# Patient Record
Sex: MALE | Race: WHITE | NOT HISPANIC OR LATINO | Employment: UNEMPLOYED | URBAN - METROPOLITAN AREA
[De-identification: names, ages, dates, MRNs, and addresses within clinical notes are randomized per-mention and may not be internally consistent; named-entity substitution may affect disease eponyms.]

---

## 2018-08-19 ENCOUNTER — HOSPITAL ENCOUNTER (EMERGENCY)
Facility: CLINIC | Age: 28
Discharge: HOME | End: 2018-08-19
Attending: NURSE PRACTITIONER | Admitting: NURSE PRACTITIONER
Payer: MEDICAID

## 2018-08-19 VITALS
RESPIRATION RATE: 18 BRPM | SYSTOLIC BLOOD PRESSURE: 142 MMHG | OXYGEN SATURATION: 99 % | HEART RATE: 105 BPM | BODY MASS INDEX: 25.06 KG/M2 | HEIGHT: 72 IN | DIASTOLIC BLOOD PRESSURE: 86 MMHG | WEIGHT: 185 LBS | TEMPERATURE: 98.4 F

## 2018-08-19 DIAGNOSIS — K08.89 PAIN, DENTAL: ICD-10-CM

## 2018-08-19 DIAGNOSIS — K04.7 DENTAL INFECTION: ICD-10-CM

## 2018-08-19 PROCEDURE — 99283 EMERGENCY DEPT VISIT LOW MDM: CPT | Mod: 25

## 2018-08-19 PROCEDURE — 64400 NJX AA&/STRD TRIGEMINAL NRV: CPT

## 2018-08-19 PROCEDURE — 25000132 ZZH RX MED GY IP 250 OP 250 PS 637: Performed by: NURSE PRACTITIONER

## 2018-08-19 RX ORDER — PENICILLIN V POTASSIUM 500 MG/1
500 TABLET, FILM COATED ORAL 3 TIMES DAILY
Qty: 30 TABLET | Refills: 0 | Status: SHIPPED | OUTPATIENT
Start: 2018-08-19 | End: 2018-08-29

## 2018-08-19 RX ORDER — PENICILLIN V POTASSIUM 500 MG/1
500 TABLET, FILM COATED ORAL ONCE
Status: COMPLETED | OUTPATIENT
Start: 2018-08-19 | End: 2018-08-19

## 2018-08-19 RX ORDER — BUPIVACAINE HYDROCHLORIDE AND EPINEPHRINE 5; 5 MG/ML; UG/ML
INJECTION, SOLUTION PERINEURAL
Status: DISCONTINUED
Start: 2018-08-19 | End: 2018-08-20 | Stop reason: HOSPADM

## 2018-08-19 RX ORDER — IBUPROFEN 600 MG/1
600 TABLET, FILM COATED ORAL EVERY 6 HOURS PRN
Qty: 40 TABLET | Refills: 0 | Status: SHIPPED | OUTPATIENT
Start: 2018-08-19

## 2018-08-19 RX ADMIN — PENICILLIN V POTASSIUM 500 MG: 500 TABLET, FILM COATED ORAL at 22:34

## 2018-08-19 ASSESSMENT — ENCOUNTER SYMPTOMS
FEVER: 0
VOMITING: 0
NAUSEA: 1
SINUS PRESSURE: 1

## 2018-08-19 NOTE — ED AVS SNAPSHOT
Emergency Department    [address] [place] [address]    Phone:  [phone]    Fax:  [phone]                                       Misha Sandoval   MRN: [id number]    Department:   Emergency Department   Date of Visit:  8/19/2018           After Visit Summary Signature Page     I have received my discharge instructions, and my questions have been answered. I have discussed any challenges I see with this plan with the nurse or doctor.    ..........................................................................................................................................  Patient/Patient Representative Signature      ..........................................................................................................................................  Patient Representative Print Name and Relationship to Patient    ..................................................               ................................................  Date                                            Time    ..........................................................................................................................................  Reviewed by Signature/Title    ...................................................              ..............................................  Date                                                            Time

## 2018-08-19 NOTE — ED AVS SNAPSHOT
Emergency Department    [address] [facility] [address]    Phone:  [phone]    Fax:  [phone]                                       Misha Sandoval   MRN: [id number]    Department:   Emergency Department   Date of Visit:  8/19/2018           Patient Information     Date Of Birth          1990        Your diagnoses for this visit were:     Pain, dental     Dental infection        You were seen by Gerri Soliz APRN CNP.      Follow-up Information     Follow up with Dentist, see list In 1 day.        Discharge Instructions         Discharge Instructions  Dental Pain    You have been seen today for a toothache. Your pain may be caused by an exposed nerve, an infection (pulpitis), a root abscess, or other problems. You will need to see a dentist for a solution to your tooth problem. Emergency Department care is only to help control your problem until you can see a dentist.  Today, we did not find any sign that your toothache was caused by a serious condition, but sometimes symptoms develop over time and cannot be found during an emergency visit, so it is very important that you follow up with your dentist.      Return to the Emergency Department if:    You develop a fever over 101 degrees Fahrenheit    You can t open your mouth normally, can t move your tongue well, or can t swallow    You have new or increased swelling of your face or neck.    You develop drainage of pus or foul smelling material from around your tooth.  What can I do to help myself?    Take any antibiotic the doctor may have prescribed for you today.    Avoid very hot or very cold foods as both can cause pain.    Make an appointment to see a dentist as soon as possible. If you wish, we can provide you with a list of low-cost dental clinics.       Remember that you can always come back to the Emergency Department if you are not able to see your regular doctor in the amount of time listed above, if you  get any new symptoms, or if there is anything that worries you.        Dental Resources  Name/Address/Phone Eligibility Hours Fee   [facility]  [address], [address]  [place] [address]  [phone] Anyone Call for appointment [facility]  Medical Assistance  Private Insurance   [facility]  [address] [place] [address]  [phone] Anyone Call for appointment    [place] refers to low-cost dental clinics for non-preventive care    [place] Interpreters available Prices start at   Adults        Cleaning $36-$160        X-Ray $20-40  Children        Cleaning $15        X-ray $10-20        Fluoride $10  Accepts cash, check or credit;  Does not take insurance or MA.   [facility]  [address] [place], [place]  [phone] Anyone Afternoons and evenings    September-May    Answers phones after 10 AM $30.00 per visit   ($15.00 per visit if 62 or older)   Preventive care.  Restoration care; sliding fee; MA   [facility]  [address] [place] [address]  [phone] Children birth to age 18 and pregnant women    [facility] Residents without insurance will be asked to apply for Assured Care. M TH F 8:30 am - 5 pm  T W 8:30 am - 7 pm    30 locations metro wide    Call for appointment and to confirm hours. Sliding Fee  [facility]  Medical Assistance  Assured Access  Private Insurance    8 Languages Spoken   [facility] [address] [address]  [phone] Anyone Call for appointment Sliding Fee  Accept insurance, [place],   [place] and self-pay.  Call if no insurance.    All services provided.  Staff fluent in Hmong, Laotian, Tristanian, Kinyarwanda, Venezuelan, Comoran, and Farsi.   [facility]  [address] [place], [place] [phone] Children  Adults in a walk in basis Mon - Fri. 8 - 5 pm       (closed 1-2 pm)  General  Dentists & Hygienists  Private Dentists  Dentures Fees based on family size and income ranging from 40% - 100% payment by patient.   [facility]  [address] [place]  [address]    [phone] Anyone Mon - Fri 7:30 am - 5:00 pm  By Appt.    Tues & Wed @ 3:00 call for urgent care Appt for next day service Sliding fee:  MA; Insurance   [place] [facility]  [address] [place]  [address]  [phone] Anyone Call for an appointment.  Days open vary every semester. Adult cleaning $25  Child cleaning $15  X-Rays $10-$15  Whitening services available  $75, includes cleaning  Seniors 50% off   [facility]  [address] [place]  [address] [phone] Anyone M-F 8 am - 5 pm Most insurances accepted.  MA and Sliding Scale.   [facility]  [address] [place] [address]  [phone] Anyone without insurance Call to make appt   M-F 9:00 am - 5 pm   (Closed noon hour 12-1)    6 pm- 8 pm Evening hours also available for care Sliding fee based on income and size of household.   [facility]  [address] [place] [address]  [phone] press option 1    For the [facility] press option 4 Anyone              Anyone Monday  4 - 6:30 pm  Tuesday 12:30 - 3 & 4-6:30  Thursday 8:30 - 11 am & 12-2:30 pm  September through May only    All year around on Thursdays from 5-9 pm (only time a dentist is in.) Cleanings & X-Rays Only  Cleanings:  Adults $30                   Kids $20  X-Rays:  Adults $34                Kids $10    MA and Sliding fee   [facility]  [address] [place] [address] [phone] Anyone    ([facility] interpreters available) M-F 8:00 am - 5:00 pm       By appointment only  (same day appointments available) Sliding fee ($40+ may be due at appointment, remainder billed); MA; Insurance   Open  [facility]  [address] [place] [address]    [phone]   Anyone    (Hmong interpreters available) M-Th 8:00 am - 8:00 pm  F 8:00 am - 5:00 pm    By appointment only  (same day appointments available) Sliding fee ($40+ may be due at appointment, remainder billed); MA; Insurance   [facility]  [address] [place], [place]  [address] [phone] Anyone    Must live within [facility] to qualify for sliding fee services Mon, Tues, Thurs, Fri  8:30 am - 5:00 pm  Wed. 8:30 am - 7:00 pm  All other services by appt. only Sliding Fee; MA   Offer payment plans    Have financial workers that will assist with [place] and will use sliding fee for those who do not qualify.      [facility]  [address] [place] [phone] Anyone without insurance     Hours and day of week vary  (Call ahead for hours)    Walk-in only Free Services    Cleanings; Fillings; Extractions   [facility]  [address] [place] [address]  [phone] Anyone Call for an appointment Accept patients with [place] and Medical Assistance.  10% discount if bill is paid in full on day of service.  No sliding fee scale.     [facility]  [address] [place] [address]  [phone] Anyone M-F  8 am-5 pm  Call for appt.    Walk-in hours 8 am - 11am and 1 pm - 5 pm, however take scheduled appointments first    No emergency services or oral surgeries Sliding Fee available with an [place] or [place] denial letter and proof of income.    Accepts Assured Access card and [place] coverage.     Name/Address/Phone Eligibility Hours Fee   [facility]  [address] [place], [place]  [phone] Anyone with emergencies only M & W clinic begins at 6 pm   Call ahead    Alternate Fridays for children by Appt only Free   [facility]  [address] [place]  [address]  [phone]    Emergencies (adults only):  [phone] Anyone Free walk-in screens for oral surgery    Call ahead for hours    All other services by appt. only  Accepts MA for pediatrics only    Rates are about 25% - 40% less than private dental office.    No sliding fee scale   [facility]  [address] [place] [address]  [phone] Anyone as long as they do not have health insurance Hours on Mondays, Tuesdays, and Thursdays Sliding fees based on monthly income    No root canals, tooth pulls or emergencies   [facility]  [address] [place] [phone] Anyone  M - F 8:00 am - 5:00 pm  Wed 8:00 am - 8 pm Sliding fees; MA; Insurance   [facility]  [address] [place] [address]  [phone] Anyone age 55+ M - F 8:00 am - 4:30 pm    Appt. only Set slightly lower fees;  MA; Insurance         Give Kids a smile day in [facility] Children Takes place in February at a few locations             24 Hour Appointment Hotline       To make an appointment at any [facility], call [phone]). If you don't have a family doctor or clinic, we will help you find one. [facility] are conveniently located to serve the needs of you and your family.             Review of your medicines      START taking        Dose / Directions Last dose taken    ibuprofen 600 MG tablet   Commonly known as:  ADVIL/MOTRIN   Dose:  600 mg   Quantity:  40 tablet        Take 1 tablet (600 mg) by mouth every 6 hours as needed for moderate pain   Refills:  0        penicillin V potassium 500 MG tablet   Commonly known as:  VEETID   Dose:  500 mg   Quantity:  30 tablet        Take 1 tablet (500 mg) by mouth 3 times daily for 10 days   Refills:  0                Prescriptions were sent or printed at these locations (2 Prescriptions)                   Other Prescriptions                Printed at Department/Unit printer (2  of 2)         ibuprofen (ADVIL/MOTRIN) 600 MG tablet               penicillin V potassium (VEETID) 500 MG tablet                Orders Needing Specimen Collection     None      Pending Results     No orders found from 8/17/2018 to 8/20/2018.            Pending Culture Results     No orders found from 8/17/2018 to 8/20/2018.            Pending Results Instructions     If you had any lab results that were not finalized at the time of your Discharge, you can call the ED Lab Result RN at [phone]. You will be contacted by this team for any positive Lab results or changes in treatment. The nurses are available 7 days a week from 10A to 6:30P.  You can leave a message 24 hours per day and they will return your call.        Test Results From Your Hospital Stay               Clinical Quality Measure: Blood Pressure Screening     Your blood pressure was checked while you were in the emergency department today. The last reading we obtained was  BP: 142/86 . Please read the guidelines below about what these numbers mean and what you should do about them.  If your systolic blood pressure (the top number) is less than 120 and your diastolic blood pressure (the bottom number) is less than 80, then your blood pressure is normal. There is nothing more that you need to do about it.  If your systolic blood pressure (the top number) is 120-139 or your diastolic blood pressure (the bottom number) is 80-89, your blood pressure may be higher than it should be. You should have your blood pressure rechecked within a year by a primary care provider.  If your systolic blood pressure (the top number) is 140 or greater or your diastolic blood pressure (the bottom number) is 90 or greater, you may have high blood pressure. High blood pressure is treatable, but if left untreated over time it can put you at risk for heart attack, stroke, or kidney failure. You should have your blood pressure rechecked by a primary care provider within the  "next 4 weeks.  If your provider in the emergency department today gave you specific instructions to follow-up with your doctor or provider even sooner than that, you should follow that instruction and not wait for up to 4 weeks for your follow-up visit.        Thank you for choosing [facility]       Thank you for choosing [facility] for your care. Our goal is always to provide you with excellent care. Hearing back from our patients is one way we can continue to improve our services. Please take a few minutes to complete the written survey that you may receive in the mail after you visit with us. Thank you!        [employer] Information     [employer] lets you send messages to your doctor, view your test results, renew your prescriptions, schedule appointments and more. To sign up, go to [email or web address] . Click on \"Log in\" on the left side of the screen, which will take you to the Welcome page. Then click on \"Sign up Now\" on the right side of the page.     You will be asked to enter the access code listed below, as well as some personal information. Please follow the directions to create your username and password.     Your access code is: [email or web address]  Expires: 2018 10:23 PM     Your access code will  in 90 days. If you need help or a new code, please call your [facility] clinic or [phone].        Care EveryWhere ID     This is your Care EveryWhere ID. This could be used by other organizations to access your [facility] medical records  [id number]        Equal Access to Services     RUMA RODRÍGUEZ AH: Hadii laura Jalloh, waaxda lujosefaadaha, qaybta kaalmada karie, reji jones. So [facility] [phone].    ATENCIÓN: Si habla español, tiene a velasco disposición servicios gratuitos de asistencia lingüística. Wilfredo al [phone].    We comply with applicable federal civil rights laws and [place] laws. We do not discriminate on the basis of race, color, national origin, age, " disability, sex, sexual orientation, or gender identity.            After Visit Summary       This is your record. Keep this with you and show to your community pharmacist(s) and doctor(s) at your next visit.

## 2018-08-20 NOTE — ED PROVIDER NOTES
History     Chief Complaint:  Dental Pain     HPI   Misha Sandoval is a 28 year old male who presents for evaluation of dental pain. Approximately five days ago, the patient started to develop left-sided dental pain with associated nausea and sinus pressure as well as the sensation of swelling in his throat. He has had a bad taste in his mouth and has tasted some discharge from near the site of his pain. Due to his persistent dental pain tonight, the patient came into the ED for evaluation with primary concern that he has a dental infection. Currently in the ED, the patient rates his pain at a severity of 8/10. He has not yet contacted his dentist regarding his recent dental pain. He has not had any fever or vomiting.     Allergies:  Codeine      Medications:    The patient is not currently taking any prescribed medications.      Past Medical History:    The patient denies any relevant past medical history.     Past Surgical History:    History reviewed. No pertinent past surgical history.     Family History:    History reviewed. No pertinent family history.     Social History:  Tobacco use:    Current every day smoker  Alcohol use:    Positive  Marital status:    Single   Accompanied to ED by:  Friend      Review of Systems   Constitutional: Negative for fever.   HENT: Positive for dental problem and sinus pressure.    Gastrointestinal: Positive for nausea. Negative for vomiting.   All other systems reviewed and are negative.    Physical Exam   First Vitals:  BP: 142/86  Pulse: 105  Heart Rate: 105  Temp: 98.4  F (36.9  C)  Resp: 18  Height: 182.9 cm (6')  Weight: 83.9 kg (185 lb)  SpO2: 99 %    Physical Exam  Physical Exam   Constitutional:  Sitting up in bed comfortably. Non-toxic appearing.   Head: Head moves freely with normal range of motion.   ENT: Oropharynx is clear and moist. No trismus. Uvula midline with no swelling. No obvious gingival fluctuance or erythema surrounding the left upper back molar. No  facial erythema or edema. No submandibular fullness.   Eyes: Conjunctivae pink. EOMs intact.   Neck: Normal range of motion.   Cardiovascular: Regular rate and rhythm. Normal heart sounds. No concerning murmur. Intact distal pulses: radial pulses 2+ on the right, 2+ on the left.   Pulmonary/Chest: No respiratory distress. No decreased breath sounds. No wheezes. No rhonchi. No rales. Lungs clear throughout.   Abdominal: Soft. Non-tender. No rebound, no guarding.   Musculoskeletal: No peripheral edema. Distal capillary refill and sensation intact.   Neurological: Oriented to person, place, and time. No focal deficits.   Skin: Skin is warm and normal in color. No rash noted.      Emergency Department Course     Procedures:    Dental Block     INDICATIONS: Dental pain    ANESTHESIA: Local infiltration using 0.5% Bupivacaine with epinephrine surrounding the left upper back molar..     PATIENT STATUS: The patient tolerated the procedure well and there were no immediate complications.      Interventions:  Veetid 500 mg PO     Emergency Department Course:  Nursing notes and vitals reviewed.  2152: I performed an exam of the patient as documented above.     2200:  A dental block was performed as outlined in the procedure note above.  The patient tolerated well and there were no complications.     Findings and plan explained to the Patient. Patient discharged home with instructions regarding supportive care, medications, and reasons to return. The importance of close follow-up was reviewed. The patient was prescribed Veetid and Ibuprofen.     Impression & Plan      Medical Decision Making:    After evaluation of the gums and teeth there is no evidence for obvious dental abscess that would be amenable to bedside I & D, no exam evidence to suggest ulcerative gingivitis, facial or oral floor cellulitis. Pain is most likely related to dental infection secondary to dental decay/caries.     Plan: Local infiltration for immediate  pain relief. Ibuprofen for ongoing pain control. Pen VK tid x 10 days, initial dose given here. Need for dentist follow up as soon as able. Given list. Reasons to return here discussed. Patient amenable to plan.       Diagnosis:    ICD-10-CM   1. Pain, dental K08.89   2. Dental infection K04.7       Disposition:  Discharged to home with Veetid and Ibuprofen.     Discharge Medications:  New Prescriptions    IBUPROFEN (ADVIL/MOTRIN) 600 MG TABLET    Take 1 tablet (600 mg) by mouth every 6 hours as needed for moderate pain    PENICILLIN V POTASSIUM (VEETID) 500 MG TABLET    Take 1 tablet (500 mg) by mouth 3 times daily for 10 days         Nain STARK, daniela serving as a scribe at 9:52 PM on 8/19/2018 to document services personally performed by Gerri Soliz NP, based on my observations and the provider's statements to me.     EMERGENCY DEPARTMENT       Gerri Soliz APRN CNP  08/19/18 4078

## 2018-08-20 NOTE — DISCHARGE INSTRUCTIONS
Discharge Instructions  Dental Pain    You have been seen today for a toothache. Your pain may be caused by an exposed nerve, an infection (pulpitis), a root abscess, or other problems. You will need to see a dentist for a solution to your tooth problem. Emergency Department care is only to help control your problem until you can see a dentist.  Today, we did not find any sign that your toothache was caused by a serious condition, but sometimes symptoms develop over time and cannot be found during an emergency visit, so it is very important that you follow up with your dentist.      Return to the Emergency Department if:    You develop a fever over 101 degrees Fahrenheit    You can t open your mouth normally, can t move your tongue well, or can t swallow    You have new or increased swelling of your face or neck.    You develop drainage of pus or foul smelling material from around your tooth.  What can I do to help myself?    Take any antibiotic the doctor may have prescribed for you today.    Avoid very hot or very cold foods as both can cause pain.    Make an appointment to see a dentist as soon as possible. If you wish, we can provide you with a list of low-cost dental clinics.       Remember that you can always come back to the Emergency Department if you are not able to see your regular doctor in the amount of time listed above, if you get any new symptoms, or if there is anything that worries you.        Dental Resources  Name/Address/Phone Eligibility Hours Fee   [facility]  [address], [address]  [place] [address]  [phone] Anyone Call for appointment [facility]  Medical [facility]  Private Insurance   [facility]  Hygiene Clinic  [facility] [place], [place] [id number] [phone] Anyone Call for appointment    [facility] refers to low-cost dental clinics for non-preventive care    Icelandic Interpreters available Prices start at   Adults        Cleaning $36-$160         X-Ray $20-40  Children        Cleaning $15        X-ray $10-20        Fluoride $10  Accepts cash, check or credit;  Does not take insurance or MA.   [facility]  [address] [place], [place]  [id number] [phone] Anyone Afternoons and evenings    September-May    Answers phones after 10 AM $30.00 per visit   ($15.00 per visit if 62 or older)   Preventive care.  Restoration care; sliding fee; MA   [facility]  [address] [place] [address] [phone] Children birth to age 18 and pregnant women    [facility] Residents without insurance will be asked to apply for Assured Care. M TH F 8:30 am - 5 pm  T W 8:30 am - 7 pm    30 locations metro wide    Call for appointment and to confirm hours. Sliding Fee  [facility]  Medical Assistance  Assured Access  Private Insurance    8 Languages Spoken   [facility] [address] [address]  [phone] Anyone Call for appointment Sliding Fee  Accept insurance, [place],   [place] and self-pay.  Call if no insurance.    All services provided.  Staff fluent in Hmong, Laotian, Syriac, Setswana, Frisian, Tamazight, and Farsi.   [facility]  [address] [place], [place] [id number] [phone] Children  Adults in a walk in basis Mon - Fri. 8 - 5 pm       (closed 1-2 pm)  General Dentists & Hygienists  Private Dentists  Dentures Fees based on family size and income ranging from 40% - 100% payment by patient.   [facility]  [address] [place], [place]  [id number] [phone] Anyone Mon - Fri 7:30 am - 5:00 pm  By Appt.    Tues & Wed @ 3:00 call for urgent care Appt for next day service Sliding fee:  MA; Insurance   [facility]  [address] [place], [place]  [id number] [phone] Anyone Call for an appointment.  Days open vary every semester. Adult cleaning $25  Child cleaning $15  X-Rays  $10-$15  Whitening services available  $75, includes cleaning  Seniors 50% off   [facility]  [address] [place], [place]  [id number] [phone] Anyone M-F 8 am - 5 pm Most insurances accepted.  MA and Sliding Scale.   [facility]  [facility] [place], [place]  [id number] [phone] Anyone without insurance Call to make appt   M-F 9:00 am - 5 pm   (Closed noon hour 12-1)    6 pm- 8 pm Evening hours also available for care Sliding fee based on income and size of household.   [facility]ental Clinic  [address] [place]  [address]  [phone] press option 1    For the [facility] press option 4 Anyone              Anyone Monday  4 - 6:30 pm  Tuesday 12:30 - 3 & 4-6:30  Thursday 8:30 - 11 am & 12-2:30 pm  September through May only    All year around on Thursdays from 5-9 pm (only time a dentist is in.) Cleanings & X-Rays Only  Cleanings:  Adults $30                   Kids $20  X-Rays:  Adults $34                Kids $10    MA and Sliding fee   [facility]  [address] [place], [place] [phone] Anyone    (Umbel interpreters available) M-F 8:00 am - 5:00 pm       By appointment only  (same day appointments available) Sliding fee ($40+ may be due at appointment, remainder billed); MA; Insurance   [facility]  [address] [place], [place] [phone]   Anyone    (Beakerong interpreters available) M-Th 8:00 am - 8:00 pm  F 8:00 am - 5:00 pm    By appointment only  (same day appointments available) Sliding fee ($40+ may be due at appointment, remainder billed); MA; Insurance   [facility]  [address] [place], [place]  [id number] [phone] Anyone    Must live within [facility] to qualify for sliding fee services Mon, Tues, Thurs, Fri  8:30 am - 5:00 pm  Wed. 8:30 am - 7:00 pm  All other services by  appt. only Sliding Fee; MA   Offer payment plans    Have financial workers that will assist with [place] and will use sliding fee for those who do not qualify.      [facility]  [address] [place] [address]  [phone] Anyone without insurance     Hours and day of week vary  (Call ahead for hours)    Walk-in only Free Services    Cleanings; Fillings; Extractions   [address]  [address] [phone] Anyone Call for an appointment Accept patients with [place] and Medical Assistance.  10% discount if bill is paid in full on day of service.  No sliding fee scale.     [facility]  [address] [place], [place] [phone] Anyone M-F  8 am-5 pm  Call for appt.    Walk-in hours 8 am - 11am and 1 pm - 5 pm, however take scheduled appointments first    No emergency services or oral surgeries Sliding Fee available with an [place] or [place] denial letter and proof of income.    Accepts Assured Access card and MA coverage.     Name/Address/Phone Eligibility Hours Fee   [facility]  [address] [place], [place]  [phone] Anyone with emergencies only M & W clinic begins at 6 pm   Call ahead    Alternate Fridays for children by Appt only Free   [facility]  [address] [place] [address]  [phone]    Emergencies (adults only):  [phone] Anyone Free walk-in screens for oral surgery    Call ahead for hours    All other services by appt. only  Accepts MA for pediatrics only    Rates are about 25% - 40% less than private dental office.    No sliding fee scale   [facility]  [address] [place], [place]  [phone] Anyone as long as they do not have health insurance Hours on Mondays, Tuesdays, and Thursdays Sliding fees based on monthly income    No root canals, tooth pulls or emergencies   [facility]  [address] [facility] [address] [address] [phone] Anyone  M - F 8:00 am - 5:00 pm  Wed 8:00 am - 8 pm Sliding fees; MA; Insurance   [address]  [address] [phone] Anyone age 55+ M - F 8:00 am - 4:30 pm    Appt. only Set slightly lower fees;  MA; Insurance         Give Kids a smile day in [facility] Children Takes place in February at a few locations

## 2024-07-27 ENCOUNTER — HOSPITAL ENCOUNTER (EMERGENCY)
Facility: CLINIC | Age: 34
Discharge: HOME | End: 2024-07-27
Attending: EMERGENCY MEDICINE | Admitting: EMERGENCY MEDICINE
Payer: COMMERCIAL

## 2024-07-27 ENCOUNTER — APPOINTMENT (OUTPATIENT)
Dept: GENERAL RADIOLOGY | Facility: CLINIC | Age: 34
End: 2024-07-27
Attending: EMERGENCY MEDICINE
Payer: COMMERCIAL

## 2024-07-27 VITALS
RESPIRATION RATE: 20 BRPM | DIASTOLIC BLOOD PRESSURE: 86 MMHG | OXYGEN SATURATION: 96 % | HEART RATE: 127 BPM | SYSTOLIC BLOOD PRESSURE: 134 MMHG

## 2024-07-27 DIAGNOSIS — M25.561 ACUTE PAIN OF RIGHT KNEE: ICD-10-CM

## 2024-07-27 DIAGNOSIS — R00.0 TACHYCARDIA: ICD-10-CM

## 2024-07-27 DIAGNOSIS — F32.A DEPRESSION, UNSPECIFIED DEPRESSION TYPE: ICD-10-CM

## 2024-07-27 DIAGNOSIS — F41.9 ANXIETY: ICD-10-CM

## 2024-07-27 PROCEDURE — 99283 EMERGENCY DEPT VISIT LOW MDM: CPT

## 2024-07-27 PROCEDURE — 250N000013 HC RX MED GY IP 250 OP 250 PS 637: Performed by: EMERGENCY MEDICINE

## 2024-07-27 PROCEDURE — 73562 X-RAY EXAM OF KNEE 3: CPT | Mod: RT

## 2024-07-27 RX ORDER — ACETAMINOPHEN 325 MG/1
650 TABLET ORAL ONCE
Status: COMPLETED | OUTPATIENT
Start: 2024-07-27 | End: 2024-07-27

## 2024-07-27 RX ORDER — IBUPROFEN 600 MG/1
600 TABLET, FILM COATED ORAL ONCE
Status: COMPLETED | OUTPATIENT
Start: 2024-07-27 | End: 2024-07-27

## 2024-07-27 RX ADMIN — ACETAMINOPHEN 650 MG: 325 TABLET, FILM COATED ORAL at 07:16

## 2024-07-27 RX ADMIN — IBUPROFEN 600 MG: 600 TABLET ORAL at 07:17

## 2024-07-27 ASSESSMENT — ACTIVITIES OF DAILY LIVING (ADL)
ADLS_ACUITY_SCORE: 35
ADLS_ACUITY_SCORE: 35
ADLS_ACUITY_SCORE: 33

## 2024-07-27 NOTE — ED NOTES
"Writer attempted to draw patient blood. Patient refusing at this time stating, \"I don't want my blood drawn at this time. I was suppose to get something for pain and my xrays.\" MD notified. Patient asking about going to EMPATH.  "
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
## 2024-07-27 NOTE — ED NOTES
"Patient arrives via EMS from [facility] for evaluation of disorganized thinking and paranoia. Patient voluntarily arrived at the PD and asked for a ride to the hospital but gave no further explanation. Per EMS, patient was stating things like \"I don't safe\" and \"People are trying to kill me\". Per EMS, PD refused to give any explanation as to why the patient came in to the department.   "

## 2024-07-27 NOTE — ED NOTES
Patient left room and went out to triage, after EMS left. Patient placed on ALON by verbal per MD. Patient spoke to provider in hallway. Patient elected to leave after talking to provider and provider ok with patient leaving. Patient given backpack and rest of belongings.

## 2024-07-27 NOTE — ED NOTES
While RN was receiving report on the patient, the patient eloped to triage and was asking for a ride and how to find another hospital. RN asked MD if patient was holdable and relayed EMS's report on the patient. MD stated patient was holdable due to the information that was given by EMS. Code 21 was called. MD evaluated patient immediately, MD deemed patient safe to discharge.
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
## 2024-07-27 NOTE — ED NOTES
[facility]  ED to EMPATH Checklist:      Goal for EMPATH: Paranoia management    Current Behavior: Flat Affect, Restless, and Cooperative    Safety Concerns: None    Legal Hold Status: Voluntary    Medically Cleared by ED provider: Yes    Patient Therapeutically Searched: Refused    Belongings: Remain with patient    Independent Ambulation at Baseline: Yes/No: Yes    Participates in Care/Conversation: Yes/No: Yes    Patient Informed about EMPATH: Yes/No: Yes    DEC: Ordered and pending    Patient Ready to be Transferred to EMPATH? Yes/No: Yes

## 2024-07-27 NOTE — ED NOTES
Received call from triage stating the taxi was here for the patient. Writer went to check in on patient. Patient is not in room, backpack is gone. There is a pair of sunglasses and a black bag left on the floor.

## 2025-01-25 ENCOUNTER — HOSPITAL ENCOUNTER (EMERGENCY)
Facility: CLINIC | Age: 35
Discharge: HOME | End: 2025-01-25
Attending: EMERGENCY MEDICINE | Admitting: EMERGENCY MEDICINE
Payer: COMMERCIAL

## 2025-01-25 VITALS
HEART RATE: 117 BPM | DIASTOLIC BLOOD PRESSURE: 82 MMHG | BODY MASS INDEX: 26.09 KG/M2 | RESPIRATION RATE: 18 BRPM | OXYGEN SATURATION: 99 % | HEIGHT: 72 IN | SYSTOLIC BLOOD PRESSURE: 131 MMHG | WEIGHT: 192.6 LBS | TEMPERATURE: 98.4 F

## 2025-01-25 DIAGNOSIS — F19.90 POLYSUBSTANCE USE DISORDER: ICD-10-CM

## 2025-01-25 PROCEDURE — 99283 EMERGENCY DEPT VISIT LOW MDM: CPT | Performed by: EMERGENCY MEDICINE

## 2025-01-25 PROCEDURE — 250N000013 HC RX MED GY IP 250 OP 250 PS 637: Performed by: EMERGENCY MEDICINE

## 2025-01-25 RX ORDER — ONDANSETRON 4 MG/1
4 TABLET, ORALLY DISINTEGRATING ORAL ONCE
Status: COMPLETED | OUTPATIENT
Start: 2025-01-25 | End: 2025-01-25

## 2025-01-25 RX ORDER — LORAZEPAM 1 MG/1
1 TABLET ORAL ONCE
Status: COMPLETED | OUTPATIENT
Start: 2025-01-25 | End: 2025-01-25

## 2025-01-25 RX ORDER — POLYETHYLENE GLYCOL 3350 17 G
4 POWDER IN PACKET (EA) ORAL
Status: DISCONTINUED | OUTPATIENT
Start: 2025-01-25 | End: 2025-01-25 | Stop reason: HOSPADM

## 2025-01-25 RX ORDER — ACETAMINOPHEN 325 MG/1
650 TABLET ORAL EVERY 4 HOURS PRN
Status: DISCONTINUED | OUTPATIENT
Start: 2025-01-25 | End: 2025-01-25 | Stop reason: HOSPADM

## 2025-01-25 RX ADMIN — LORAZEPAM 1 MG: 1 TABLET ORAL at 19:57

## 2025-01-25 RX ADMIN — NICOTINE POLACRILEX 4 MG: 2 LOZENGE ORAL at 19:58

## 2025-01-25 ASSESSMENT — COLUMBIA-SUICIDE SEVERITY RATING SCALE - C-SSRS
2. HAVE YOU ACTUALLY HAD ANY THOUGHTS OF KILLING YOURSELF IN THE PAST MONTH?: NO
6. HAVE YOU EVER DONE ANYTHING, STARTED TO DO ANYTHING, OR PREPARED TO DO ANYTHING TO END YOUR LIFE?: NO
1. IN THE PAST MONTH, HAVE YOU WISHED YOU WERE DEAD OR WISHED YOU COULD GO TO SLEEP AND NOT WAKE UP?: NO

## 2025-01-25 ASSESSMENT — ACTIVITIES OF DAILY LIVING (ADL)
ADLS_ACUITY_SCORE: 41
ADLS_ACUITY_SCORE: 41

## 2025-01-26 ENCOUNTER — HOSPITAL ENCOUNTER (EMERGENCY)
Facility: CLINIC | Age: 35
Discharge: HOME | End: 2025-01-27
Attending: EMERGENCY MEDICINE | Admitting: EMERGENCY MEDICINE
Payer: COMMERCIAL

## 2025-01-26 DIAGNOSIS — J10.1 INFLUENZA A: ICD-10-CM

## 2025-01-26 LAB
BASOPHILS # BLD AUTO: 0 10E3/UL (ref 0–0.2)
BASOPHILS NFR BLD AUTO: 0 %
EOSINOPHIL # BLD AUTO: 0.2 10E3/UL (ref 0–0.7)
EOSINOPHIL NFR BLD AUTO: 3 %
ERYTHROCYTE [DISTWIDTH] IN BLOOD BY AUTOMATED COUNT: 12.2 % (ref 10–15)
HCT VFR BLD AUTO: 41.2 % (ref 40–53)
HGB BLD-MCNC: 13.7 G/DL (ref 13.3–17.7)
IMM GRANULOCYTES # BLD: 0 10E3/UL
IMM GRANULOCYTES NFR BLD: 0 %
LYMPHOCYTES # BLD AUTO: 1.8 10E3/UL (ref 0.8–5.3)
LYMPHOCYTES NFR BLD AUTO: 30 %
MCH RBC QN AUTO: 29.8 PG (ref 26.5–33)
MCHC RBC AUTO-ENTMCNC: 33.3 G/DL (ref 31.5–36.5)
MCV RBC AUTO: 90 FL (ref 78–100)
MONOCYTES # BLD AUTO: 0.5 10E3/UL (ref 0–1.3)
MONOCYTES NFR BLD AUTO: 7 %
NEUTROPHILS # BLD AUTO: 3.6 10E3/UL (ref 1.6–8.3)
NEUTROPHILS NFR BLD AUTO: 59 %
NRBC # BLD AUTO: 0 10E3/UL
NRBC BLD AUTO-RTO: 0 /100
PLATELET # BLD AUTO: 187 10E3/UL (ref 150–450)
RBC # BLD AUTO: 4.59 10E6/UL (ref 4.4–5.9)
WBC # BLD AUTO: 6.1 10E3/UL (ref 4–11)

## 2025-01-26 PROCEDURE — 96375 TX/PRO/DX INJ NEW DRUG ADDON: CPT | Performed by: EMERGENCY MEDICINE

## 2025-01-26 PROCEDURE — 85004 AUTOMATED DIFF WBC COUNT: CPT | Performed by: EMERGENCY MEDICINE

## 2025-01-26 PROCEDURE — 99284 EMERGENCY DEPT VISIT MOD MDM: CPT | Mod: 25 | Performed by: EMERGENCY MEDICINE

## 2025-01-26 PROCEDURE — 85014 HEMATOCRIT: CPT | Performed by: EMERGENCY MEDICINE

## 2025-01-26 PROCEDURE — 84484 ASSAY OF TROPONIN QUANT: CPT | Performed by: EMERGENCY MEDICINE

## 2025-01-26 PROCEDURE — 96374 THER/PROPH/DIAG INJ IV PUSH: CPT | Performed by: EMERGENCY MEDICINE

## 2025-01-26 PROCEDURE — 80053 COMPREHEN METABOLIC PANEL: CPT | Performed by: EMERGENCY MEDICINE

## 2025-01-26 PROCEDURE — 258N000003 HC RX IP 258 OP 636: Performed by: EMERGENCY MEDICINE

## 2025-01-26 PROCEDURE — 96361 HYDRATE IV INFUSION ADD-ON: CPT | Performed by: EMERGENCY MEDICINE

## 2025-01-26 PROCEDURE — 250N000011 HC RX IP 250 OP 636: Performed by: EMERGENCY MEDICINE

## 2025-01-26 PROCEDURE — 87637 SARSCOV2&INF A&B&RSV AMP PRB: CPT | Performed by: EMERGENCY MEDICINE

## 2025-01-26 PROCEDURE — 93005 ELECTROCARDIOGRAM TRACING: CPT | Performed by: EMERGENCY MEDICINE

## 2025-01-26 PROCEDURE — 250N000013 HC RX MED GY IP 250 OP 250 PS 637: Performed by: EMERGENCY MEDICINE

## 2025-01-26 PROCEDURE — 36415 COLL VENOUS BLD VENIPUNCTURE: CPT | Performed by: EMERGENCY MEDICINE

## 2025-01-26 PROCEDURE — 93010 ELECTROCARDIOGRAM REPORT: CPT | Performed by: EMERGENCY MEDICINE

## 2025-01-26 PROCEDURE — 99284 EMERGENCY DEPT VISIT MOD MDM: CPT | Performed by: EMERGENCY MEDICINE

## 2025-01-26 PROCEDURE — 83690 ASSAY OF LIPASE: CPT | Performed by: EMERGENCY MEDICINE

## 2025-01-26 PROCEDURE — 250N000009 HC RX 250: Performed by: EMERGENCY MEDICINE

## 2025-01-26 RX ORDER — ACETAMINOPHEN 325 MG/1
650 TABLET ORAL ONCE
Status: COMPLETED | OUTPATIENT
Start: 2025-01-26 | End: 2025-01-26

## 2025-01-26 RX ORDER — ONDANSETRON 2 MG/ML
4 INJECTION INTRAMUSCULAR; INTRAVENOUS ONCE
Status: COMPLETED | OUTPATIENT
Start: 2025-01-26 | End: 2025-01-26

## 2025-01-26 RX ORDER — LIDOCAINE HYDROCHLORIDE 20 MG/ML
10 SOLUTION OROPHARYNGEAL ONCE
Status: COMPLETED | OUTPATIENT
Start: 2025-01-26 | End: 2025-01-26

## 2025-01-26 RX ORDER — MAGNESIUM HYDROXIDE/ALUMINUM HYDROXICE/SIMETHICONE 120; 1200; 1200 MG/30ML; MG/30ML; MG/30ML
15 SUSPENSION ORAL ONCE
Status: COMPLETED | OUTPATIENT
Start: 2025-01-26 | End: 2025-01-26

## 2025-01-26 RX ADMIN — ACETAMINOPHEN 650 MG: 325 TABLET, FILM COATED ORAL at 23:31

## 2025-01-26 RX ADMIN — ALUMINUM HYDROXIDE, MAGNESIUM HYDROXIDE, AND SIMETHICONE 15 ML: 200; 200; 20 SUSPENSION ORAL at 23:31

## 2025-01-26 RX ADMIN — PANTOPRAZOLE SODIUM 40 MG: 40 INJECTION, POWDER, FOR SOLUTION INTRAVENOUS at 23:31

## 2025-01-26 RX ADMIN — ONDANSETRON 4 MG: 2 INJECTION INTRAMUSCULAR; INTRAVENOUS at 23:31

## 2025-01-26 RX ADMIN — SODIUM CHLORIDE 1000 ML: 9 INJECTION, SOLUTION INTRAVENOUS at 23:31

## 2025-01-26 RX ADMIN — LIDOCAINE HYDROCHLORIDE 10 ML: 20 SOLUTION ORAL at 23:31

## 2025-01-26 ASSESSMENT — COLUMBIA-SUICIDE SEVERITY RATING SCALE - C-SSRS
1. IN THE PAST MONTH, HAVE YOU WISHED YOU WERE DEAD OR WISHED YOU COULD GO TO SLEEP AND NOT WAKE UP?: NO
6. HAVE YOU EVER DONE ANYTHING, STARTED TO DO ANYTHING, OR PREPARED TO DO ANYTHING TO END YOUR LIFE?: NO
2. HAVE YOU ACTUALLY HAD ANY THOUGHTS OF KILLING YOURSELF IN THE PAST MONTH?: NO

## 2025-01-26 ASSESSMENT — ACTIVITIES OF DAILY LIVING (ADL): ADLS_ACUITY_SCORE: 41

## 2025-01-26 NOTE — ED TRIAGE NOTES
Pt reports using meth (daily), fentanyl (couple times a week), and marijuana rarely use.) Pt reports, trying to not use fentanyl. Pt currently has been using more meth daily via snort, injection, and smoke. Last use meth this morning.     Pt is here seeking tx program/facility. Pt was advised to come to the ED from [facility] team per pt.      Triage Assessment (Adult)       Row Name 01/25/25 0504          Triage Assessment    Airway WDL WDL        Respiratory WDL    Respiratory WDL WDL        Skin Circulation/Temperature WDL    Skin Circulation/Temperature WDL WDL        Cardiac WDL    Cardiac WDL WDL        Peripheral/Neurovascular WDL    Peripheral Neurovascular WDL WDL        Cognitive/Neuro/Behavioral WDL    Cognitive/Neuro/Behavioral WDL WDL

## 2025-01-26 NOTE — DISCHARGE INSTRUCTIONS
[place] [address], [address]  [place] [address]  Phone: [phone]  Fax:  [phone]       Hours:       Monday, Wednesday, Friday     Scheduled visits: 9:00 am - 4:00 pm     Walk-in hours:  9:00 am - 3:00 pm        Tuesday, Thursday     Walk-in only hours: 1:30 pm - 4:00 pm

## 2025-01-26 NOTE — ED PROVIDER NOTES
"    [facility] [place]    1/25/25      ED PROVIDER NOTE    History     Chief Complaint   Patient presents with    Addiction Problem     The history is provided by the patient and medical records.     Misha Sandoval is a 34 year old male with a history notable for polysubstance abuse (with opioids, methamphetamine, and marijuana) with IVDU who presents to the ED seeking addiction help. He uses meth daily. He states he smokes, smokes, and injects meth. He has been using more meth because he has been trying to cut down on fentanyl use. He uses fentanyl a few times a week. He also reports rare marijuana use. No etoh use reported. He states that he feels his substance use has negatively been impacting his life so the crisis response team sent him here.     He has been to [facility] before but did not like it. He states he is not allowed to go to [address] [place] [address].     He states he feels anxious being here \"almost a panic attack.\" He denies chest pain or shortness of breath.    Past Medical History  History reviewed. No pertinent past medical history.  History reviewed. No pertinent surgical history.  ibuprofen (ADVIL/MOTRIN) 600 MG tablet      Allergies   Allergen Reactions    Codeine Hives     Family History  History reviewed. No pertinent family history.  Social History   Social History     Tobacco Use    Smoking status: Every Day    Smokeless tobacco: Never   Substance Use Topics    Alcohol use: Yes    Drug use: Yes     Types: Marijuana, Fentanyl, Methamphetamines      Past medical history, past surgical history, medications, allergies, family history, and social history were reviewed with the patient. No additional pertinent items.     A medically appropriate review of systems was performed with pertinent positives and negatives noted in the HPI, and all other systems negative.    Physical Exam   BP: 124/79  Pulse: (!) 120  Temp: 98  F (36.7  C)  Resp: 18  Height: 182.9 cm (6')  Weight: 87.4 " kg (192 lb 9.6 oz)  SpO2: 98 %    Physical Exam  Constitutional:       General: He is not in acute distress.     Appearance: He is not toxic-appearing.   HENT:      Head: Normocephalic and atraumatic.      Nose: Nose normal.      Mouth/Throat:      Mouth: Mucous membranes are moist.      Pharynx: Oropharynx is clear.   Eyes:      Conjunctiva/sclera: Conjunctivae normal.      Pupils: Pupils are equal, round, and reactive to light.   Cardiovascular:      Rate and Rhythm: Regular rhythm. Tachycardia present.      Heart sounds: No murmur heard.  Pulmonary:      Effort: Pulmonary effort is normal. No respiratory distress.      Breath sounds: No wheezing.   Musculoskeletal:         General: Normal range of motion.   Skin:     General: Skin is warm and dry.   Neurological:      General: No focal deficit present.      Mental Status: He is alert and oriented to person, place, and time.   Psychiatric:         Mood and Affect: Mood is anxious.           ED Course, Procedures, & Data      Procedures                No results found for any visits on 01/25/25.  Medications   acetaminophen (TYLENOL) tablet 650 mg (has no administration in time range)   nicotine (COMMIT) lozenge 4 mg (4 mg Buccal $Given 1/25/25 1958)   LORazepam (ATIVAN) tablet 1 mg (1 mg Oral $Given 1/25/25 1957)   ondansetron (ZOFRAN ODT) ODT tab 4 mg (4 mg Oral Not Given 1/25/25 2053)     Labs Ordered and Resulted from Time of ED Arrival to Time of ED Departure - No data to display  No orders to display          Critical care was not performed.     Medical Decision Making  The patient's presentation was of high complexity (a chronic illness severe exacerbation, progression, or side effect of treatment).    The patient's evaluation involved:  review of external note(s) from 1 sources (ED notes)  review of 3+ test result(s) ordered prior to this encounter (cbc, bmp, troponin)  discussion of management or test interpretation with another health professional (see  separate area of note for details)    The patient's management necessitated high risk (a decision regarding hospitalization).    Assessment & Plan    This is a 34-year-old male presenting seeking detox from methamphetamines, fentanyl, marijuana.  On arrival vitals were notable for tachycardia but otherwise reassuring. He denied chest pain or shortness of breath. Rhythm was regular and no murmurs. Tachycardia may be in part from substance use and anxiety. Detox here does not accept patients for detox from the substances.  The patient can not go to [address] [place] [address] or [facility].  I did call other detox facilities including the [facility] detox and [facility] both of which do not have beds available.  I discussed this with the patient and offered outpatient resources which she was agreeable to.  He was discharged in stable condition.  Return precautions discussed and all questions answered.      [facility] not [facility]  [address] he can't go  Not a candidate for detox here  No beds at [facility] or [facility]    I have reviewed the nursing notes. I have reviewed the findings, diagnosis, plan and need for follow up with the patient.    Discharge Medication List as of 1/25/2025  8:57 PM          Final diagnoses:   Polysubstance use disorder       Eric Merlos MD  [facility] EMERGENCY DEPARTMENT  1/25/2025     Eric Merlos MD  01/25/25 5231

## 2025-01-27 VITALS
SYSTOLIC BLOOD PRESSURE: 132 MMHG | TEMPERATURE: 97.7 F | HEIGHT: 72 IN | BODY MASS INDEX: 26.12 KG/M2 | RESPIRATION RATE: 18 BRPM | OXYGEN SATURATION: 100 % | DIASTOLIC BLOOD PRESSURE: 86 MMHG | HEART RATE: 95 BPM

## 2025-01-27 LAB
ALBUMIN SERPL BCG-MCNC: 4 G/DL (ref 3.5–5.2)
ALP SERPL-CCNC: 106 U/L (ref 40–150)
ALT SERPL W P-5'-P-CCNC: 25 U/L (ref 0–70)
ANION GAP SERPL CALCULATED.3IONS-SCNC: 8 MMOL/L (ref 7–15)
AST SERPL W P-5'-P-CCNC: 21 U/L (ref 0–45)
ATRIAL RATE - MUSE: 89 BPM
BILIRUB SERPL-MCNC: 0.2 MG/DL
BUN SERPL-MCNC: 12.9 MG/DL (ref 6–20)
CALCIUM SERPL-MCNC: 9.2 MG/DL (ref 8.8–10.4)
CHLORIDE SERPL-SCNC: 101 MMOL/L (ref 98–107)
CREAT SERPL-MCNC: 0.89 MG/DL (ref 0.67–1.17)
DIASTOLIC BLOOD PRESSURE - MUSE: NORMAL MMHG
EGFRCR SERPLBLD CKD-EPI 2021: >90 ML/MIN/1.73M2
FLUAV RNA SPEC QL NAA+PROBE: POSITIVE
FLUBV RNA RESP QL NAA+PROBE: NEGATIVE
GLUCOSE SERPL-MCNC: 91 MG/DL (ref 70–99)
HCO3 SERPL-SCNC: 28 MMOL/L (ref 22–29)
INTERPRETATION ECG - MUSE: NORMAL
LIPASE SERPL-CCNC: 29 U/L (ref 13–60)
P AXIS - MUSE: 78 DEGREES
POTASSIUM SERPL-SCNC: 4.1 MMOL/L (ref 3.4–5.3)
PR INTERVAL - MUSE: 140 MS
PROT SERPL-MCNC: 7.4 G/DL (ref 6.4–8.3)
QRS DURATION - MUSE: 86 MS
QT - MUSE: 346 MS
QTC - MUSE: 420 MS
R AXIS - MUSE: 53 DEGREES
RSV RNA SPEC NAA+PROBE: NEGATIVE
SARS-COV-2 RNA RESP QL NAA+PROBE: NEGATIVE
SODIUM SERPL-SCNC: 137 MMOL/L (ref 135–145)
SYSTOLIC BLOOD PRESSURE - MUSE: NORMAL MMHG
T AXIS - MUSE: 58 DEGREES
TROPONIN T SERPL HS-MCNC: 21 NG/L
VENTRICULAR RATE- MUSE: 89 BPM

## 2025-01-27 PROCEDURE — 250N000013 HC RX MED GY IP 250 OP 250 PS 637: Performed by: EMERGENCY MEDICINE

## 2025-01-27 RX ORDER — OSELTAMIVIR PHOSPHATE 75 MG/1
75 CAPSULE ORAL ONCE
Status: COMPLETED | OUTPATIENT
Start: 2025-01-27 | End: 2025-01-27

## 2025-01-27 RX ORDER — ONDANSETRON 4 MG/1
4 TABLET, ORALLY DISINTEGRATING ORAL EVERY 8 HOURS PRN
Qty: 10 TABLET | Refills: 0 | Status: SHIPPED | OUTPATIENT
Start: 2025-01-27

## 2025-01-27 RX ORDER — OSELTAMIVIR PHOSPHATE 75 MG/1
75 CAPSULE ORAL 2 TIMES DAILY
Qty: 10 CAPSULE | Refills: 0 | Status: SHIPPED | OUTPATIENT
Start: 2025-01-27 | End: 2025-02-01

## 2025-01-27 RX ADMIN — OSELTAMAVIR PHOSPHATE 75 MG: 75 CAPSULE ORAL at 01:10

## 2025-01-27 ASSESSMENT — ACTIVITIES OF DAILY LIVING (ADL): ADLS_ACUITY_SCORE: 41

## 2025-01-27 NOTE — DISCHARGE INSTRUCTIONS
You have been seen in the emergency department today for influenza A.  This is a viral infection that can cause fever, body aches, headache, cough, sore throat, and sometimes GI symptoms.  We have given you your first dose of Tamiflu which is an antiviral medication here in the emergency department.  We are sending you home with a prescription for Tamiflu which you can take for 5 days.  Be advised that this medication can sometimes cause nausea and vomiting so we are also giving you a prescription for Zofran.  You can use Tylenol or ibuprofen as needed for fever or bodyaches.  Expect that it may take several days to feel better, this is typical with influenza.    Please follow-up with your primary clinic.  Return to the emergency department for new or worsening symptoms.

## 2025-01-27 NOTE — ED TRIAGE NOTES
Triage Assessment (Adult)       Row Name 01/26/25 2224          Triage Assessment    Airway WDL WDL        Respiratory WDL    Respiratory WDL WDL

## 2025-01-27 NOTE — ED PROVIDER NOTES
"    [facility]ERGENCY DEPARTMENT ([facility])    1/26/25      ED PROVIDER NOTE    History     Chief Complaint   Patient presents with    Abdominal Pain     Stomach pain after eating at [employer]. Experiencing headache/nausea/ stomach feeling hot. Small emesis per pt. Tightness in chest, feeling weak.     The history is provided by the patient and medical records.     Misha Sandoval is a 34 year old male with a history of polysubstance abuse and homelessness who presents to the emergency department via EMS today for feeling unwell.  Patient reports that at his shelter tonight he had had a dinner there.  He does not know exactly what the dinner was, he states there was a pork and a \"non-pork\" option.  He chose the non-pork option.  It appeared to be some meat with yams and corn, he also states that he had a small bite-size Butterfinger candy that somebody gave him.  This was at about 6:30 PM.  Shortly thereafter he began to feel unwell.  He experienced some acid indigestion and tightness in his chest, he felt nauseated and eventually was able to induce a small emesis.  Denies hematemesis.  He continued to notice ongoing chest tightness and some shortness of breath, tightness seem to radiate to the left arm.  He does feel weak.  He has endorsed some subjective fevers and chills but has not actually documented a temperature.  No unusual nasal congestion or sore throat.  No cough.  He has had some epigastric and lower abdominal discomfort earlier today.  No diarrhea.  No dysuria or hematuria.  He has also noted some overall myalgias and muscle aches as well as a headache.    Patient is concerned that this Butterfinger candy bar might have been poisoned as he says \"there are people at the shelter who do not like me\".  He admits to \"drinking\" meth this morning in his coffee.  He states he uses less than half a gram of meth daily and is now drinking it.  He previously was using fentanyl, last used fentanyl " over 1 week ago.  Denies other drug use.  Denies any SI or HI.      This part of the medical record was transcribed by Delisa Preciado Medical Scribe, from a dictation done by Thelma Donald MD.     Past Medical History  History reviewed. No pertinent past medical history.  History reviewed. No pertinent surgical history.  ibuprofen (ADVIL/MOTRIN) 600 MG tablet  ondansetron (ZOFRAN ODT) 4 MG ODT tab  oseltamivir (TAMIFLU) 75 MG capsule      Allergies   Allergen Reactions    Codeine Hives     Family History  History reviewed. No pertinent family history.  Social History   Social History     Tobacco Use    Smoking status: Every Day    Smokeless tobacco: Never   Substance Use Topics    Alcohol use: Yes    Drug use: Yes     Types: Marijuana, Fentanyl, Methamphetamines     Comment: Used meth today, this morning.      Past medical history, past surgical history, medications, allergies, family history, and social history were reviewed with the patient. No additional pertinent items.     A medically appropriate review of systems was performed with pertinent positives and negatives noted in the HPI, and all other systems negative.    Physical Exam   BP: 129/79  Pulse: (!) 111  Temp: 97.7  F (36.5  C)  Resp: 16  Height: 182.9 cm (6')  SpO2: 98 %    Physical Exam  Vitals and nursing note reviewed.   Constitutional:       General: He is not in acute distress.     Appearance: He is not diaphoretic.      Comments: Adult male, lying back in bed, appears to feel unwell but is not toxic appearing.   HENT:      Head: Atraumatic.      Mouth/Throat:      Mouth: Mucous membranes are moist.      Pharynx: Oropharynx is clear. No oropharyngeal exudate.   Eyes:      General: No scleral icterus.     Pupils: Pupils are equal, round, and reactive to light.   Cardiovascular:      Rate and Rhythm: Normal rate.      Pulses: Normal pulses.      Heart sounds: No murmur heard.  Pulmonary:      Effort: No respiratory distress.      Breath sounds:  Normal breath sounds.   Abdominal:      Palpations: Abdomen is soft.      Tenderness: There is abdominal tenderness. There is no guarding or rebound.      Comments: Mild epigastric tenderness to palpation without guarding or rebound.  Very minimal suprapubic tenderness to palpation without guarding or rebound.  No left or right sided tenderness to palpation.  Slightly hypoactive bowel sounds.   Musculoskeletal:         General: No tenderness.   Skin:     General: Skin is warm.      Findings: No rash.   Neurological:      General: No focal deficit present.       ED Course, Procedures, & Data      Procedures            EKG Interpretation:      Interpreted by Thelma Donald MD  Time reviewed: 2320  Symptoms at time of EKG: chest tightness   Rhythm: normal sinus with sinus arrhythmia  Rate: normal  Axis: NORMAL  Ectopy: none  Conduction: normal  ST Segments/ T Waves: No ST-T wave changes  Q Waves: none  Comparison to prior: No old EKG available    Clinical Impression: normal EKG            Results for orders placed or performed during the hospital encounter of 01/26/25   Comprehensive metabolic panel     Status: Normal   Result Value Ref Range    Sodium 137 135 - 145 mmol/L    Potassium 4.1 3.4 - 5.3 mmol/L    Carbon Dioxide (CO2) 28 22 - 29 mmol/L    Anion Gap 8 7 - 15 mmol/L    Urea Nitrogen 12.9 6.0 - 20.0 mg/dL    Creatinine 0.89 0.67 - 1.17 mg/dL    GFR Estimate >90 >60 mL/min/1.73m2    Calcium 9.2 8.8 - 10.4 mg/dL    Chloride 101 98 - 107 mmol/L    Glucose 91 70 - 99 mg/dL    Alkaline Phosphatase 106 40 - 150 U/L    AST 21 0 - 45 U/L    ALT 25 0 - 70 U/L    Protein Total 7.4 6.4 - 8.3 g/dL    Albumin 4.0 3.5 - 5.2 g/dL    Bilirubin Total 0.2 <=1.2 mg/dL   Lipase     Status: Normal   Result Value Ref Range    Lipase 29 13 - 60 U/L   Troponin T, High Sensitivity     Status: Normal   Result Value Ref Range    Troponin T, High Sensitivity 21 <=22 ng/L   Influenza A/B, RSV and SARS-CoV2 PCR (COVID-19)  Nasopharyngeal     Status: Abnormal    Specimen: Nasopharyngeal; Swab   Result Value Ref Range    Influenza A PCR Positive (A) Negative    Influenza B PCR Negative Negative    RSV PCR Negative Negative    SARS CoV2 PCR Negative Negative    Narrative    Testing was performed using the Xpert Xpress CoV2/Flu/RSV Assay on the Cepheid GeneXpert Instrument. This test should be ordered for the detection of SARS-CoV2, influenza, and RSV viruses in individuals with signs and symptoms of respiratory tract infection. This test is for in vitro diagnostic use under the [place] FDA for laboratories certified under CLIA to perform high or moderate complexity testing. This test has been [place] FDA cleared. A negative result does not rule out the presence of PCR inhibitors in the specimen or target RNA in concentration below the limit of detection for the assay. If only one viral target is positive but coinfection with multiple targets is suspected, the sample should be re-tested with another FDA cleared, approved, or authorized test, if coninfection would change clinical management. This test was validated by the [facility]. These laboratories are certified under the Clinical Laboratory Improvement Amendments of 1988 (CLIA-88) as qualified to perfom high complexity laboratory testing.   CBC with platelets and differential     Status: None   Result Value Ref Range    WBC Count 6.1 4.0 - 11.0 10e3/uL    RBC Count 4.59 4.40 - 5.90 10e6/uL    Hemoglobin 13.7 13.3 - 17.7 g/dL    Hematocrit 41.2 40.0 - 53.0 %    MCV 90 78 - 100 fL    MCH 29.8 26.5 - 33.0 pg    MCHC 33.3 31.5 - 36.5 g/dL    RDW 12.2 10.0 - 15.0 %    Platelet Count 187 150 - 450 10e3/uL    % Neutrophils 59 %    % Lymphocytes 30 %    % Monocytes 7 %    % Eosinophils 3 %    % Basophils 0 %    % Immature Granulocytes 0 %    NRBCs per 100 WBC 0 <1 /100    Absolute Neutrophils 3.6 1.6 - 8.3 10e3/uL    Absolute Lymphocytes 1.8 0.8 - 5.3 10e3/uL    Absolute Monocytes  0.5 0.0 - 1.3 10e3/uL    Absolute Eosinophils 0.2 0.0 - 0.7 10e3/uL    Absolute Basophils 0.0 0.0 - 0.2 10e3/uL    Absolute Immature Granulocytes 0.0 <=0.4 10e3/uL    Absolute NRBCs 0.0 10e3/uL   EKG 12 lead     Status: None (Preliminary result)   Result Value Ref Range    Systolic Blood Pressure  mmHg    Diastolic Blood Pressure  mmHg    Ventricular Rate 89 BPM    Atrial Rate 89 BPM    HI Interval 140 ms    QRS Duration 86 ms     ms    QTc 420 ms    P Axis 78 degrees    R AXIS 53 degrees    T Axis 58 degrees    Interpretation ECG Sinus rhythm with sinus arrhythmia  Normal ECG      CBC with Platelets & Differential     Status: None    Narrative    The following orders were created for panel order CBC with Platelets & Differential.  Procedure                               Abnormality         Status                     ---------                               -----------         ------                     CBC with platelets and d...[[id number]]                      Final result                 Please view results for these tests on the individual orders.     Medications   oseltamivir (TAMIFLU) capsule 75 mg (has no administration in time range)   pantoprazole (PROTONIX) IV push injection 40 mg (40 mg Intravenous $Given 1/26/25 2331)   alum & mag hydroxide-simethicone (MAALOX) suspension 15 mL (15 mLs Oral $Given 1/26/25 2331)   lidocaine (viscous) (XYLOCAINE) 2 % solution 10 mL (10 mLs Mouth/Throat $Given 1/26/25 2331)   ondansetron (ZOFRAN) injection 4 mg (4 mg Intravenous $Given 1/26/25 2331)   acetaminophen (TYLENOL) tablet 650 mg (650 mg Oral $Given 1/26/25 2331)   sodium chloride 0.9% BOLUS 1,000 mL (1,000 mLs Intravenous $New Bag 1/26/25 2331)     Labs Ordered and Resulted from Time of ED Arrival to Time of ED Departure   INFLUENZA A/B, RSV AND SARS-COV2 PCR - Abnormal       Result Value    Influenza A PCR Positive (*)     Influenza B PCR Negative      RSV PCR Negative      SARS CoV2 PCR Negative      COMPREHENSIVE METABOLIC PANEL - Normal    Sodium 137      Potassium 4.1      Carbon Dioxide (CO2) 28      Anion Gap 8      Urea Nitrogen 12.9      Creatinine 0.89      GFR Estimate >90      Calcium 9.2      Chloride 101      Glucose 91      Alkaline Phosphatase 106      AST 21      ALT 25      Protein Total 7.4      Albumin 4.0      Bilirubin Total 0.2     LIPASE - Normal    Lipase 29     TROPONIN T, HIGH SENSITIVITY - Normal    Troponin T, High Sensitivity 21     CBC WITH PLATELETS AND DIFFERENTIAL    WBC Count 6.1      RBC Count 4.59      Hemoglobin 13.7      Hematocrit 41.2      MCV 90      MCH 29.8      MCHC 33.3      RDW 12.2      Platelet Count 187      % Neutrophils 59      % Lymphocytes 30      % Monocytes 7      % Eosinophils 3      % Basophils 0      % Immature Granulocytes 0      NRBCs per 100 WBC 0      Absolute Neutrophils 3.6      Absolute Lymphocytes 1.8      Absolute Monocytes 0.5      Absolute Eosinophils 0.2      Absolute Basophils 0.0      Absolute Immature Granulocytes 0.0      Absolute NRBCs 0.0       No orders to display          Critical care was not performed.     Medical Decision Making  The patient's presentation was of moderate complexity (an acute illness with systemic symptoms).    The patient's evaluation involved:  review of external note(s) from 1 sources (see separate area of note for details)  ordering and/or review of 3+ test(s) in this encounter (see separate area of note for details)    The patient's management necessitated moderate risk (prescription drug management including medications given in the ED).    Assessment & Plan    Patient presents for the above complaints.  On my evaluation he is alert, cooperative, appears to feel unwell but is not toxic appearing.  He is in no acute distress.  Vital signs show mild tachycardia with a heart rate of 111, he is afebrile with a temp of 97.7.    Differential diagnosis could include any sort of viral etiology which needs to be  considered given the rather abrupt onset of symptoms.  Also need to consider ACS which I think is less likely.  Gastritis/reflux is a possibility.  Pancreatitis would be a possibility.    We did establish IV access and we did draw blood for laboratory analysis.  CBC is within normal limits with a normal white count of 6.1, normal hemoglobin and normal count, CMP is completely within normal limits, lipase is within normal limits, troponin is within the normal range.  Influenza A is positive, Influenza B is negative, COVID swab was negative.  Patient was given a GI cocktail, Protonix, and Zofran.  Tylenol and IV fluids were also ordered.    When influenza resulted positive, PO tamiflu ordered for him here in the ED. Discussed results with patient, scripts for zofran and tamiflu written. Follow up with primary clinic as needed.     I have reviewed the nursing notes. I have reviewed the findings, diagnosis, plan and need for follow up with the patient.    This part of the medical record was transcribed by Delisa Preciado Medical Scribe, from a dictation done by Thelma Donald MD.     New Prescriptions    ONDANSETRON (ZOFRAN ODT) 4 MG ODT TAB    Take 1 tablet (4 mg) by mouth every 8 hours as needed for nausea.    OSELTAMIVIR (TAMIFLU) 75 MG CAPSULE    Take 1 capsule (75 mg) by mouth 2 times daily for 5 days.       Final diagnoses:   Influenza A       Thelma Donald MD  [facility] EMERGENCY DEPARTMENT  1/26/2025     Thelma Donald MD  01/27/25 0100

## 2025-03-06 ENCOUNTER — HOSPITAL ENCOUNTER (EMERGENCY)
Facility: CLINIC | Age: 35
Discharge: HOME | End: 2025-03-06
Attending: EMERGENCY MEDICINE | Admitting: EMERGENCY MEDICINE
Payer: COMMERCIAL

## 2025-03-06 ENCOUNTER — APPOINTMENT (OUTPATIENT)
Dept: CT IMAGING | Facility: CLINIC | Age: 35
End: 2025-03-06
Attending: EMERGENCY MEDICINE
Payer: COMMERCIAL

## 2025-03-06 VITALS
OXYGEN SATURATION: 97 % | BODY MASS INDEX: 25.73 KG/M2 | WEIGHT: 190 LBS | RESPIRATION RATE: 16 BRPM | HEART RATE: 111 BPM | SYSTOLIC BLOOD PRESSURE: 137 MMHG | HEIGHT: 72 IN | DIASTOLIC BLOOD PRESSURE: 77 MMHG

## 2025-03-06 DIAGNOSIS — S09.90XA CLOSED HEAD INJURY, INITIAL ENCOUNTER: ICD-10-CM

## 2025-03-06 PROCEDURE — 250N000013 HC RX MED GY IP 250 OP 250 PS 637: Performed by: EMERGENCY MEDICINE

## 2025-03-06 PROCEDURE — 70450 CT HEAD/BRAIN W/O DYE: CPT

## 2025-03-06 PROCEDURE — 99284 EMERGENCY DEPT VISIT MOD MDM: CPT | Mod: 25

## 2025-03-06 PROCEDURE — 250N000011 HC RX IP 250 OP 636: Performed by: EMERGENCY MEDICINE

## 2025-03-06 RX ORDER — ACETAMINOPHEN 325 MG/1
975 TABLET ORAL ONCE
Status: COMPLETED | OUTPATIENT
Start: 2025-03-06 | End: 2025-03-06

## 2025-03-06 RX ORDER — ONDANSETRON 4 MG/1
4 TABLET, ORALLY DISINTEGRATING ORAL ONCE
Status: COMPLETED | OUTPATIENT
Start: 2025-03-06 | End: 2025-03-06

## 2025-03-06 RX ADMIN — ACETAMINOPHEN 975 MG: 325 TABLET, FILM COATED ORAL at 04:25

## 2025-03-06 RX ADMIN — ONDANSETRON 4 MG: 4 TABLET, ORALLY DISINTEGRATING ORAL at 04:25

## 2025-03-06 ASSESSMENT — ACTIVITIES OF DAILY LIVING (ADL): ADLS_ACUITY_SCORE: 41

## 2025-03-06 NOTE — ED PROVIDER NOTES
"  Emergency Department Note      History of Present Illness     Chief Complaint   Fall (Pt states that he slipped and fell on the ice. Unsure of loc. Not cooperative with care)      HPI   Misha Sandoval is a 34 year old male with past medical history of polysubstance abuse who presents to the emergency department after head trauma.  Patient reports that he slipped and fell on the ice earlier today and hit the back of his head.  Since the incident he has had some nausea and vomiting.  Denies any vision changes.  Denies any additional injuries.  Took some ibuprofen earlier without significant relief    Physical Exam     Patient Vitals for the past 24 hrs:   BP Pulse Resp SpO2 Height Weight   03/06/25 0416 137/77 111 -- -- -- --   03/06/25 0355 -- 120 16 97 % 1.829 m (6') 86.2 kg (190 lb)     Physical Exam  General: Patient is alert, awake and interactive when I enter the room  Head: mild occipital hematoma   Eyes: The pupils are equal, round, and reactive to light. Conjunctivae and sclerae are normal  Neck: Normal range of motion. No cervical midline tenderness.   CV: Regular rate.   Resp:  No distress. No crepitance.     MS: Normal tone. No C/T/L tenderness in midline  Skin: No rash or lesions noted. Normal capillary refill noted  Neuro:  GCS 15.  CN\"s II-XII intact. Speech is normal and fluent. Face is symmetric. Strength is normal and symmetric.   Psych: Normal affect.  Appropriate interactions.     Diagnostics     Imaging   CT Head w/o Contrast   Final Result   IMPRESSION:   1.  Normal head CT.        ED Course      Medications Administered   Medications   acetaminophen (TYLENOL) tablet 975 mg (975 mg Oral $Given 3/6/25 0425)   ondansetron (ZOFRAN ODT) ODT tab 4 mg (4 mg Oral $Given 3/6/25 0425)     Medical Decision Making / Diagnosis       MDM   Misha Sandoval is a 34 year old male who presents for evaluation after fall with trauma to the head.  His exam shows a contusion to the head. Less likely concussion " given scenario but discussed with patient.  The differential diagnosis includes skull fracture, epidural hematoma, subdural hematoma, intracerebral hemorrhage, and traumatic subarachnoid hemorrhage; all of these are highly unlikely in this clinical setting given negative CT.  Risk of delayed bleed is low but patient informed of possiblity.  CT done given age and clinical concern.  No signs of laceration.  I doubt underlying skull fracture. Return to ED for red flags (change in behavior, severe headache, drowsiness, seizures, vomiting, etc) and gave head contusion precautions for home.  I did stress importance of avoiding a second blow to head just in case he has a concussion.  His head to toe trauma exam is otherwise negative for serious underlying disease of the head, neck, chest, abdomen, extremities, pelvis.       Disposition   The patient was discharged.     Diagnosis     ICD-10-CM    1. Closed head injury, initial encounter  S09.90XA            MD Steven Goncalves Christopher Joseph, MD  03/06/25 0554

## 2025-03-06 NOTE — ED TRIAGE NOTES
"Pt came in by ems after he slipped on the ice and hit his posterior head on the pavement. Noncompliant for ems. Not cooperative with care upon arrival to  ed. Pt states that he took ibuprofen 'earlier\"     Triage Assessment (Adult)       Row Name 03/06/25 0353          Triage Assessment    Airway WDL WDL        Respiratory WDL    Respiratory WDL WDL        Skin Circulation/Temperature WDL    Skin Circulation/Temperature WDL WDL        Cognitive/Neuro/Behavioral WDL    Cognitive/Neuro/Behavioral WDL WDL                     "

## 2025-04-28 ENCOUNTER — HOSPITAL ENCOUNTER (EMERGENCY)
Facility: CLINIC | Age: 35
Discharge: HOME HEALTH | End: 2025-04-28
Payer: COMMERCIAL

## 2025-04-28 NOTE — PROGRESS NOTES
Pt. Was lying on the ground when this writer, triage nurse tried to get patient to come into triage room. Pt. Stated he was in too much pain to get up off the ground. EMS had brought patient in and patient was observed sitting in the chair minutes earlier.     When patient was asked if he could walk into the triage room, or use a wheelchair to get patient into patient room, declined, stating he should have been brought into a room right away. Pt. Was informed that there are currently no rooms available, so he will be triaged and treatment will begin. Pt. Declined to try getting to triage room.    The charge nurse was notified, and spoke with patient stating that we would like to get him triaged, but we needed to get him into the triage room in a wheelchair or ambulate. Pt. Declined. Pt. Was told we would try to approach him in another 5 minutes to see if he would be more amendable to completing triage at this time.     Pt. Stood up, took his belonging, and left without being triaged.
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
## 2025-07-19 ENCOUNTER — APPOINTMENT (OUTPATIENT)
Dept: CT IMAGING | Facility: CLINIC | Age: 35
End: 2025-07-19
Attending: EMERGENCY MEDICINE
Payer: COMMERCIAL

## 2025-07-19 ENCOUNTER — HOSPITAL ENCOUNTER (EMERGENCY)
Facility: CLINIC | Age: 35
Discharge: HOME | End: 2025-07-19
Attending: EMERGENCY MEDICINE | Admitting: EMERGENCY MEDICINE
Payer: COMMERCIAL

## 2025-07-19 ENCOUNTER — APPOINTMENT (OUTPATIENT)
Dept: GENERAL RADIOLOGY | Facility: CLINIC | Age: 35
End: 2025-07-19
Attending: EMERGENCY MEDICINE
Payer: COMMERCIAL

## 2025-07-19 VITALS
WEIGHT: 180.6 LBS | HEART RATE: 91 BPM | HEIGHT: 69 IN | RESPIRATION RATE: 18 BRPM | DIASTOLIC BLOOD PRESSURE: 93 MMHG | SYSTOLIC BLOOD PRESSURE: 134 MMHG | OXYGEN SATURATION: 100 % | BODY MASS INDEX: 26.75 KG/M2 | TEMPERATURE: 97.9 F

## 2025-07-19 DIAGNOSIS — F51.3 SLEEPWALKING: ICD-10-CM

## 2025-07-19 DIAGNOSIS — L03.114 CELLULITIS OF LEFT HAND: ICD-10-CM
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
## 2025-07-19 DIAGNOSIS — L03.113 CELLULITIS OF RIGHT HAND: ICD-10-CM

## 2025-07-19 LAB
ANION GAP SERPL CALCULATED.3IONS-SCNC: 12 MMOL/L (ref 7–15)
BASOPHILS # BLD AUTO: 0 10E3/UL (ref 0–0.2)
BASOPHILS NFR BLD AUTO: 1 %
BUN SERPL-MCNC: 13.3 MG/DL (ref 6–20)
CALCIUM SERPL-MCNC: 9.4 MG/DL (ref 8.8–10.4)
CHLORIDE SERPL-SCNC: 108 MMOL/L (ref 98–107)
CREAT SERPL-MCNC: 0.76 MG/DL (ref 0.67–1.17)
CRP SERPL-MCNC: 8.13 MG/L
EGFRCR SERPLBLD CKD-EPI 2021: >90 ML/MIN/1.73M2
EOSINOPHIL # BLD AUTO: 0.1 10E3/UL (ref 0–0.7)
EOSINOPHIL NFR BLD AUTO: 2 %
ERYTHROCYTE [DISTWIDTH] IN BLOOD BY AUTOMATED COUNT: 12.9 % (ref 10–15)
ERYTHROCYTE [SEDIMENTATION RATE] IN BLOOD BY WESTERGREN METHOD: 13 MM/HR (ref 0–15)
GLUCOSE SERPL-MCNC: 138 MG/DL (ref 70–99)
HBV SURFACE AB SERPL IA-ACNC: 130 M[IU]/ML
HBV SURFACE AB SERPL IA-ACNC: REACTIVE M[IU]/ML
HBV SURFACE AG SERPL QL IA: NONREACTIVE
HCO3 SERPL-SCNC: 25 MMOL/L (ref 22–29)
HCT VFR BLD AUTO: 36.8 % (ref 40–53)
HCV AB SERPL QL IA: NONREACTIVE
HGB BLD-MCNC: 12.4 G/DL (ref 13.3–17.7)
HIV 1+2 AB+HIV1 P24 AG SERPL QL IA: NONREACTIVE
IMM GRANULOCYTES # BLD: 0 10E3/UL
IMM GRANULOCYTES NFR BLD: 0 %
LYMPHOCYTES # BLD AUTO: 1.5 10E3/UL (ref 0.8–5.3)
LYMPHOCYTES NFR BLD AUTO: 24 %
MCH RBC QN AUTO: 29.8 PG (ref 26.5–33)
MCHC RBC AUTO-ENTMCNC: 33.7 G/DL (ref 31.5–36.5)
MCV RBC AUTO: 89 FL (ref 78–100)
MONOCYTES # BLD AUTO: 0.5 10E3/UL (ref 0–1.3)
MONOCYTES NFR BLD AUTO: 8 %
NEUTROPHILS # BLD AUTO: 4.1 10E3/UL (ref 1.6–8.3)
NEUTROPHILS NFR BLD AUTO: 66 %
NRBC # BLD AUTO: 0 10E3/UL
NRBC BLD AUTO-RTO: 0 /100
PLATELET # BLD AUTO: 243 10E3/UL (ref 150–450)
POTASSIUM SERPL-SCNC: 3.4 MMOL/L (ref 3.4–5.3)
RBC # BLD AUTO: 4.16 10E6/UL (ref 4.4–5.9)
SODIUM SERPL-SCNC: 145 MMOL/L (ref 135–145)
WBC # BLD AUTO: 6.3 10E3/UL (ref 4–11)

## 2025-07-19 PROCEDURE — 73030 X-RAY EXAM OF SHOULDER: CPT | Mod: 26 | Performed by: INTERNAL MEDICINE

## 2025-07-19 PROCEDURE — 86803 HEPATITIS C AB TEST: CPT | Performed by: EMERGENCY MEDICINE

## 2025-07-19 PROCEDURE — 36415 COLL VENOUS BLD VENIPUNCTURE: CPT | Performed by: EMERGENCY MEDICINE

## 2025-07-19 PROCEDURE — 93005 ELECTROCARDIOGRAM TRACING: CPT | Performed by: EMERGENCY MEDICINE

## 2025-07-19 PROCEDURE — 99284 EMERGENCY DEPT VISIT MOD MDM: CPT | Performed by: EMERGENCY MEDICINE

## 2025-07-19 PROCEDURE — 86706 HEP B SURFACE ANTIBODY: CPT | Performed by: EMERGENCY MEDICINE

## 2025-07-19 PROCEDURE — 71045 X-RAY EXAM CHEST 1 VIEW: CPT

## 2025-07-19 PROCEDURE — 84132 ASSAY OF SERUM POTASSIUM: CPT | Performed by: EMERGENCY MEDICINE

## 2025-07-19 PROCEDURE — 96374 THER/PROPH/DIAG INJ IV PUSH: CPT | Performed by: EMERGENCY MEDICINE

## 2025-07-19 PROCEDURE — 70450 CT HEAD/BRAIN W/O DYE: CPT | Mod: 26 | Performed by: RADIOLOGY

## 2025-07-19 PROCEDURE — 72128 CT CHEST SPINE W/O DYE: CPT | Mod: 26 | Performed by: RADIOLOGY

## 2025-07-19 PROCEDURE — 85018 HEMOGLOBIN: CPT | Performed by: EMERGENCY MEDICINE

## 2025-07-19 PROCEDURE — 86140 C-REACTIVE PROTEIN: CPT | Performed by: EMERGENCY MEDICINE

## 2025-07-19 PROCEDURE — 99203 OFFICE O/P NEW LOW 30 MIN: CPT | Performed by: PHYSICIAN ASSISTANT

## 2025-07-19 PROCEDURE — 87389 HIV-1 AG W/HIV-1&-2 AB AG IA: CPT | Performed by: EMERGENCY MEDICINE

## 2025-07-19 PROCEDURE — 99285 EMERGENCY DEPT VISIT HI MDM: CPT | Mod: 25 | Performed by: EMERGENCY MEDICINE

## 2025-07-19 PROCEDURE — 72125 CT NECK SPINE W/O DYE: CPT

## 2025-07-19 PROCEDURE — 72125 CT NECK SPINE W/O DYE: CPT | Mod: 26 | Performed by: RADIOLOGY

## 2025-07-19 PROCEDURE — 86618 LYME DISEASE ANTIBODY: CPT | Performed by: EMERGENCY MEDICINE

## 2025-07-19 PROCEDURE — 71045 X-RAY EXAM CHEST 1 VIEW: CPT | Mod: 26 | Performed by: RADIOLOGY

## 2025-07-19 PROCEDURE — 250N000011 HC RX IP 250 OP 636: Performed by: EMERGENCY MEDICINE

## 2025-07-19 PROCEDURE — 85652 RBC SED RATE AUTOMATED: CPT | Performed by: EMERGENCY MEDICINE

## 2025-07-19 PROCEDURE — 72131 CT LUMBAR SPINE W/O DYE: CPT | Mod: 26 | Performed by: RADIOLOGY

## 2025-07-19 PROCEDURE — 96376 TX/PRO/DX INJ SAME DRUG ADON: CPT | Performed by: EMERGENCY MEDICINE

## 2025-07-19 PROCEDURE — 73030 X-RAY EXAM OF SHOULDER: CPT | Mod: RT

## 2025-07-19 PROCEDURE — 72128 CT CHEST SPINE W/O DYE: CPT

## 2025-07-19 PROCEDURE — 72131 CT LUMBAR SPINE W/O DYE: CPT

## 2025-07-19 PROCEDURE — 87040 BLOOD CULTURE FOR BACTERIA: CPT | Performed by: EMERGENCY MEDICINE

## 2025-07-19 PROCEDURE — 93010 ELECTROCARDIOGRAM REPORT: CPT | Performed by: EMERGENCY MEDICINE

## 2025-07-19 PROCEDURE — 70450 CT HEAD/BRAIN W/O DYE: CPT

## 2025-07-19 PROCEDURE — 87340 HEPATITIS B SURFACE AG IA: CPT | Performed by: EMERGENCY MEDICINE

## 2025-07-19 RX ORDER — HYDROMORPHONE HYDROCHLORIDE 1 MG/ML
0.5 INJECTION, SOLUTION INTRAMUSCULAR; INTRAVENOUS; SUBCUTANEOUS
Refills: 0 | Status: COMPLETED | OUTPATIENT
Start: 2025-07-19 | End: 2025-07-19

## 2025-07-19 RX ORDER — SULFAMETHOXAZOLE AND TRIMETHOPRIM 800; 160 MG/1; MG/1
1 TABLET ORAL 2 TIMES DAILY
Qty: 20 TABLET | Refills: 0 | Status: SHIPPED | OUTPATIENT
Start: 2025-07-19 | End: 2025-07-29

## 2025-07-19 RX ADMIN — HYDROMORPHONE HYDROCHLORIDE 1 MG: 1 INJECTION, SOLUTION INTRAMUSCULAR; INTRAVENOUS; SUBCUTANEOUS at 13:25

## 2025-07-19 RX ADMIN — HYDROMORPHONE HYDROCHLORIDE 0.5 MG: 1 INJECTION, SOLUTION INTRAMUSCULAR; INTRAVENOUS; SUBCUTANEOUS at 10:31

## 2025-07-19 ASSESSMENT — ACTIVITIES OF DAILY LIVING (ADL)
ADLS_ACUITY_SCORE: 41

## 2025-07-19 ASSESSMENT — COLUMBIA-SUICIDE SEVERITY RATING SCALE - C-SSRS
2. HAVE YOU ACTUALLY HAD ANY THOUGHTS OF KILLING YOURSELF IN THE PAST MONTH?: NO
1. IN THE PAST MONTH, HAVE YOU WISHED YOU WERE DEAD OR WISHED YOU COULD GO TO SLEEP AND NOT WAKE UP?: NO
6. HAVE YOU EVER DONE ANYTHING, STARTED TO DO ANYTHING, OR PREPARED TO DO ANYTHING TO END YOUR LIFE?: NO

## 2025-07-19 NOTE — DISCHARGE INSTRUCTIONS
Take the Bactrim as prescribed for your cellulitis.  Follow-up with a primary care physician in the next 3 to 5 days.  Return to the emergency department if your symptoms worsen.  Follow-up with your  at [facility] for any pending labs.
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
## 2025-07-19 NOTE — PHARMACY-VANCOMYCIN DOSING SERVICE
Pharmacy Vancomycin Initial Note  Date of Service 2025  Patient's  1990  34 year old, male    Indication: Skin and Soft Tissue Infection    Current estimated CrCl = Estimated Creatinine Clearance: 158.7 mL/min (based on SCr of 0.76 mg/dL).    Creatinine for last 3 days  2025: 10:28 AM Creatinine 0.76 mg/dL    Recent Vancomycin Level(s) for last 3 days  No results found for requested labs within last 3 days.      Vancomycin IV Administrations (past 72 hours)        No vancomycin orders with administrations in past 72 hours.                    Nephrotoxins and other renal medications (From now, onward)      Start     Dose/Rate Route Frequency Ordered Stop    25 1700  vancomycin (VANCOCIN) 1,500 mg in 0.9% NaCl 250 mL intermittent infusion         1,500 mg  over 90 Minutes Intravenous EVERY 12 HOURS 25 1655              Contrast Orders - past 72 hours (72h ago, onward)      None            [employer] Prediction of Planned Initial Vancomycin Regimen  Loading dose: N/A  Regimen: 1500 mg IV every 12 hours.  Start time: 16:54 on 2025  Exposure target: AUC24 (range) 400-600 mg/L.hr   AUC24,ss: 551 mg/L.hr  Probability of AUC24 > 400: 81 %  Ctrough,ss: 15.9 mg/L  Probability of Ctrough,ss > 20: 33 %  Probability of nephrotoxicity (Lodise NESTOR ): 11 %        Plan:  Start vancomycin  1500 mg IV q12h.   Vancomycin monitoring method: AUC  Vancomycin therapeutic monitoring goal: 400-600 mg*h/L  Pharmacy will check vancomycin levels as appropriate in 1-3 Days.    Serum creatinine levels will be ordered daily for the first week of therapy and at least twice weekly for subsequent weeks.      Jennifer Becerra, ErinD
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
## 2025-07-21 LAB — B BURGDOR IGG+IGM SER QL: 0.12

## 2025-07-22 LAB
ATRIAL RATE - MUSE: 102 BPM
DIASTOLIC BLOOD PRESSURE - MUSE: NORMAL MMHG
INTERPRETATION ECG - MUSE: NORMAL
P AXIS - MUSE: 64 DEGREES
PR INTERVAL - MUSE: 138 MS
QRS DURATION - MUSE: 76 MS
QT - MUSE: 352 MS
QTC - MUSE: 458 MS
R AXIS - MUSE: 61 DEGREES
SYSTOLIC BLOOD PRESSURE - MUSE: NORMAL MMHG
T AXIS - MUSE: 48 DEGREES
VENTRICULAR RATE- MUSE: 102 BPM

## 2025-07-24 LAB
BACTERIA SPEC CULT: NO GROWTH
BACTERIA SPEC CULT: NO GROWTH

## 2025-08-05 VITALS
BODY MASS INDEX: 28.15 KG/M2 | SYSTOLIC BLOOD PRESSURE: 154 MMHG | WEIGHT: 192.2 LBS | RESPIRATION RATE: 20 BRPM | HEART RATE: 110 BPM | OXYGEN SATURATION: 97 % | TEMPERATURE: 98.1 F | DIASTOLIC BLOOD PRESSURE: 77 MMHG

## 2025-08-05 PROCEDURE — 99281 EMR DPT VST MAYX REQ PHY/QHP: CPT

## 2025-08-06 ENCOUNTER — HOSPITAL ENCOUNTER (EMERGENCY)
Facility: HOSPITAL | Age: 35
Discharge: LEFT BEFORE DISCHARGE | End: 2025-08-06
Attending: EMERGENCY MEDICINE | Admitting: EMERGENCY MEDICINE
Payer: COMMERCIAL

## 2025-08-11 ENCOUNTER — HOSPITAL ENCOUNTER (EMERGENCY)
Facility: CLINIC | Age: 35
Discharge: HOME | End: 2025-08-11
Attending: STUDENT IN AN ORGANIZED HEALTH CARE EDUCATION/TRAINING PROGRAM
Payer: COMMERCIAL

## 2025-08-11 ENCOUNTER — APPOINTMENT (OUTPATIENT)
Dept: GENERAL RADIOLOGY | Facility: CLINIC | Age: 35
End: 2025-08-11
Attending: STUDENT IN AN ORGANIZED HEALTH CARE EDUCATION/TRAINING PROGRAM
Payer: COMMERCIAL

## 2025-08-11 VITALS
BODY MASS INDEX: 25.9 KG/M2 | WEIGHT: 185 LBS | RESPIRATION RATE: 16 BRPM | SYSTOLIC BLOOD PRESSURE: 132 MMHG | OXYGEN SATURATION: 98 % | DIASTOLIC BLOOD PRESSURE: 80 MMHG | HEIGHT: 71 IN | TEMPERATURE: 98.2 F | HEART RATE: 127 BPM

## 2025-08-11 DIAGNOSIS — S93.492A SPRAIN OF ANTERIOR TALOFIBULAR LIGAMENT OF LEFT ANKLE, INITIAL ENCOUNTER: Primary | ICD-10-CM

## 2025-08-11 PROCEDURE — 73610 X-RAY EXAM OF ANKLE: CPT | Mod: 26 | Performed by: RADIOLOGY

## 2025-08-11 PROCEDURE — 99283 EMERGENCY DEPT VISIT LOW MDM: CPT | Performed by: STUDENT IN AN ORGANIZED HEALTH CARE EDUCATION/TRAINING PROGRAM

## 2025-08-11 PROCEDURE — 73610 X-RAY EXAM OF ANKLE: CPT | Mod: LT

## 2025-08-11 PROCEDURE — 250N000013 HC RX MED GY IP 250 OP 250 PS 637: Performed by: STUDENT IN AN ORGANIZED HEALTH CARE EDUCATION/TRAINING PROGRAM

## 2025-08-11 RX ORDER — ACETAMINOPHEN 325 MG/1
975 TABLET ORAL ONCE
Status: COMPLETED | OUTPATIENT
Start: 2025-08-11 | End: 2025-08-11

## 2025-08-11 RX ORDER — IBUPROFEN 600 MG/1
600 TABLET, FILM COATED ORAL ONCE
Status: COMPLETED | OUTPATIENT
Start: 2025-08-11 | End: 2025-08-11

## 2025-08-11 RX ADMIN — ACETAMINOPHEN 975 MG: 325 TABLET ORAL at 11:40

## 2025-08-11 RX ADMIN — IBUPROFEN 600 MG: 600 TABLET ORAL at 11:40

## 2025-08-11 ASSESSMENT — ACTIVITIES OF DAILY LIVING (ADL)
ADLS_ACUITY_SCORE: 41
ADLS_ACUITY_SCORE: 41

## 2025-08-20 ENCOUNTER — MEDICAL CORRESPONDENCE (OUTPATIENT)
Dept: HEALTH INFORMATION MANAGEMENT | Facility: CLINIC | Age: 35
End: 2025-08-20
Payer: COMMERCIAL

## 2025-08-23 ENCOUNTER — HOSPITAL ENCOUNTER (EMERGENCY)
Facility: CLINIC | Age: 35
Discharge: HOME | End: 2025-08-24
Admitting: PHYSICIAN ASSISTANT
Payer: COMMERCIAL

## 2025-08-23 ENCOUNTER — APPOINTMENT (OUTPATIENT)
Dept: CT IMAGING | Facility: CLINIC | Age: 35
End: 2025-08-23
Attending: PHYSICIAN ASSISTANT
Payer: COMMERCIAL

## 2025-08-23 VITALS
TEMPERATURE: 98.5 F | WEIGHT: 180 LBS | DIASTOLIC BLOOD PRESSURE: 71 MMHG | HEART RATE: 97 BPM | OXYGEN SATURATION: 99 % | HEIGHT: 71 IN | RESPIRATION RATE: 16 BRPM | SYSTOLIC BLOOD PRESSURE: 132 MMHG | BODY MASS INDEX: 25.2 KG/M2

## 2025-08-23 DIAGNOSIS — M51.26 LUMBAR DISC HERNIATION: Primary | ICD-10-CM

## 2025-08-23 LAB
ALBUMIN UR-MCNC: NEGATIVE MG/DL
ANION GAP SERPL CALCULATED.3IONS-SCNC: 8 MMOL/L (ref 7–15)
APPEARANCE UR: CLEAR
BASOPHILS # BLD AUTO: 0.05 10E3/UL (ref 0–0.2)
BASOPHILS NFR BLD AUTO: 0.8 %
BILIRUB UR QL STRIP: NEGATIVE
BUN SERPL-MCNC: 8.8 MG/DL (ref 6–20)
CALCIUM SERPL-MCNC: 9.1 MG/DL (ref 8.8–10.4)
CHLORIDE SERPL-SCNC: 104 MMOL/L (ref 98–107)
COLOR UR AUTO: COLORLESS
CREAT SERPL-MCNC: 0.67 MG/DL (ref 0.67–1.17)
EGFRCR SERPLBLD CKD-EPI 2021: >90 ML/MIN/1.73M2
EOSINOPHIL # BLD AUTO: 0.35 10E3/UL (ref 0–0.7)
EOSINOPHIL NFR BLD AUTO: 5.3 %
ERYTHROCYTE [DISTWIDTH] IN BLOOD BY AUTOMATED COUNT: 12.7 % (ref 10–15)
GLUCOSE SERPL-MCNC: 115 MG/DL (ref 70–99)
GLUCOSE UR STRIP-MCNC: NEGATIVE MG/DL
HCO3 SERPL-SCNC: 29 MMOL/L (ref 22–29)
HCT VFR BLD AUTO: 38.5 % (ref 40–53)
HGB BLD-MCNC: 12.6 G/DL (ref 13.3–17.7)
HGB UR QL STRIP: NEGATIVE
IMM GRANULOCYTES # BLD: <0.03 10E3/UL
IMM GRANULOCYTES NFR BLD: 0.3 %
KETONES UR STRIP-MCNC: NEGATIVE MG/DL
LEUKOCYTE ESTERASE UR QL STRIP: NEGATIVE
LYMPHOCYTES # BLD AUTO: 2.26 10E3/UL (ref 0.8–5.3)
LYMPHOCYTES NFR BLD AUTO: 34.1 %
MCH RBC QN AUTO: 30.5 PG (ref 26.5–33)
MCHC RBC AUTO-ENTMCNC: 32.7 G/DL (ref 31.5–36.5)
MCV RBC AUTO: 93.2 FL (ref 78–100)
MONOCYTES # BLD AUTO: 0.48 10E3/UL (ref 0–1.3)
MONOCYTES NFR BLD AUTO: 7.2 %
NEUTROPHILS # BLD AUTO: 3.47 10E3/UL (ref 1.6–8.3)
NEUTROPHILS NFR BLD AUTO: 52.3 %
NITRATE UR QL: NEGATIVE
NRBC # BLD AUTO: <0.03 10E3/UL
NRBC BLD AUTO-RTO: 0 /100
PH UR STRIP: 6 [PH] (ref 5–7)
PLATELET # BLD AUTO: 269 10E3/UL (ref 150–450)
POTASSIUM SERPL-SCNC: 4 MMOL/L (ref 3.4–5.3)
RBC # BLD AUTO: 4.13 10E6/UL (ref 4.4–5.9)
RBC URINE: 0 /HPF
SODIUM SERPL-SCNC: 141 MMOL/L (ref 135–145)
SP GR UR STRIP: 1 (ref 1–1.03)
UROBILINOGEN UR STRIP-MCNC: NORMAL MG/DL
WBC # BLD AUTO: 6.63 10E3/UL (ref 4–11)
WBC URINE: 0 /HPF

## 2025-08-23 PROCEDURE — 85004 AUTOMATED DIFF WBC COUNT: CPT | Performed by: PHYSICIAN ASSISTANT

## 2025-08-23 PROCEDURE — 99284 EMERGENCY DEPT VISIT MOD MDM: CPT | Mod: 25

## 2025-08-23 PROCEDURE — 74176 CT ABD & PELVIS W/O CONTRAST: CPT

## 2025-08-23 PROCEDURE — 36415 COLL VENOUS BLD VENIPUNCTURE: CPT | Performed by: PHYSICIAN ASSISTANT

## 2025-08-23 PROCEDURE — 72131 CT LUMBAR SPINE W/O DYE: CPT

## 2025-08-23 PROCEDURE — 81001 URINALYSIS AUTO W/SCOPE: CPT | Performed by: EMERGENCY MEDICINE

## 2025-08-23 PROCEDURE — 80048 BASIC METABOLIC PNL TOTAL CA: CPT | Performed by: PHYSICIAN ASSISTANT

## 2025-08-23 RX ORDER — KETOROLAC TROMETHAMINE 30 MG/ML
30 INJECTION, SOLUTION INTRAMUSCULAR; INTRAVENOUS ONCE
Status: DISCONTINUED | OUTPATIENT
Start: 2025-08-23 | End: 2025-08-24

## 2025-08-23 RX ORDER — KETOROLAC TROMETHAMINE 15 MG/ML
15 INJECTION, SOLUTION INTRAMUSCULAR; INTRAVENOUS ONCE
Status: DISCONTINUED | OUTPATIENT
Start: 2025-08-23 | End: 2025-08-23

## 2025-08-23 ASSESSMENT — COLUMBIA-SUICIDE SEVERITY RATING SCALE - C-SSRS
6. HAVE YOU EVER DONE ANYTHING, STARTED TO DO ANYTHING, OR PREPARED TO DO ANYTHING TO END YOUR LIFE?: YES
2. HAVE YOU ACTUALLY HAD ANY THOUGHTS OF KILLING YOURSELF IN THE PAST MONTH?: NO
1. IN THE PAST MONTH, HAVE YOU WISHED YOU WERE DEAD OR WISHED YOU COULD GO TO SLEEP AND NOT WAKE UP?: NO

## 2025-08-23 ASSESSMENT — ACTIVITIES OF DAILY LIVING (ADL)
ADLS_ACUITY_SCORE: 41
ADLS_ACUITY_SCORE: 41

## 2025-08-24 RX ORDER — CYCLOBENZAPRINE HCL 10 MG
10 TABLET ORAL 3 TIMES DAILY PRN
Qty: 21 TABLET | Refills: 0 | Status: SHIPPED | OUTPATIENT
Start: 2025-08-24 | End: 2025-08-31

## 2025-08-24 RX ORDER — IBUPROFEN 600 MG/1
600 TABLET, FILM COATED ORAL ONCE
Status: COMPLETED | OUTPATIENT
Start: 2025-08-24 | End: 2025-08-24

## 2025-08-24 RX ORDER — ACETAMINOPHEN 325 MG/1
975 TABLET ORAL ONCE
Status: COMPLETED | OUTPATIENT
Start: 2025-08-24 | End: 2025-08-24

## 2025-08-24 ASSESSMENT — ACTIVITIES OF DAILY LIVING (ADL)
ADLS_ACUITY_SCORE: 41
ADLS_ACUITY_SCORE: 41

## 2025-08-31 ENCOUNTER — APPOINTMENT (OUTPATIENT)
Dept: RADIOLOGY | Facility: HOSPITAL | Age: 35
End: 2025-08-31
Attending: STUDENT IN AN ORGANIZED HEALTH CARE EDUCATION/TRAINING PROGRAM
Payer: COMMERCIAL

## 2025-08-31 ENCOUNTER — HOSPITAL ENCOUNTER (EMERGENCY)
Facility: HOSPITAL | Age: 35
Discharge: HOME | End: 2025-08-31
Attending: STUDENT IN AN ORGANIZED HEALTH CARE EDUCATION/TRAINING PROGRAM | Admitting: STUDENT IN AN ORGANIZED HEALTH CARE EDUCATION/TRAINING PROGRAM
Payer: COMMERCIAL

## 2025-08-31 VITALS
HEIGHT: 71 IN | TEMPERATURE: 98.1 F | WEIGHT: 180 LBS | OXYGEN SATURATION: 98 % | RESPIRATION RATE: 16 BRPM | HEART RATE: 95 BPM | SYSTOLIC BLOOD PRESSURE: 150 MMHG | DIASTOLIC BLOOD PRESSURE: 89 MMHG | BODY MASS INDEX: 25.2 KG/M2

## 2025-08-31 DIAGNOSIS — R46.89 AGGRESSIVE BEHAVIOR: ICD-10-CM

## 2025-08-31 DIAGNOSIS — M79.672 LEFT FOOT PAIN: Primary | ICD-10-CM

## 2025-08-31 PROCEDURE — 99283 EMERGENCY DEPT VISIT LOW MDM: CPT | Performed by: STUDENT IN AN ORGANIZED HEALTH CARE EDUCATION/TRAINING PROGRAM

## 2025-08-31 PROCEDURE — 73620 X-RAY EXAM OF FOOT: CPT | Mod: LT

## 2025-08-31 PROCEDURE — 73502 X-RAY EXAM HIP UNI 2-3 VIEWS: CPT

## 2025-08-31 ASSESSMENT — ACTIVITIES OF DAILY LIVING (ADL)
ADLS_ACUITY_SCORE: 41
ADLS_ACUITY_SCORE: 41

## 2025-08-31 ASSESSMENT — COLUMBIA-SUICIDE SEVERITY RATING SCALE - C-SSRS
6. HAVE YOU EVER DONE ANYTHING, STARTED TO DO ANYTHING, OR PREPARED TO DO ANYTHING TO END YOUR LIFE?: NO
2. HAVE YOU ACTUALLY HAD ANY THOUGHTS OF KILLING YOURSELF IN THE PAST MONTH?: NO
1. IN THE PAST MONTH, HAVE YOU WISHED YOU WERE DEAD OR WISHED YOU COULD GO TO SLEEP AND NOT WAKE UP?: NO

## 2025-09-02 ENCOUNTER — HOSPITAL ENCOUNTER (EMERGENCY)
Facility: CLINIC | Age: 35
Discharge: HOME | End: 2025-09-02
Attending: EMERGENCY MEDICINE
Payer: COMMERCIAL

## 2025-09-02 VITALS
BODY MASS INDEX: 25.2 KG/M2 | HEART RATE: 113 BPM | TEMPERATURE: 97.9 F | OXYGEN SATURATION: 100 % | SYSTOLIC BLOOD PRESSURE: 122 MMHG | DIASTOLIC BLOOD PRESSURE: 87 MMHG | WEIGHT: 180 LBS | HEIGHT: 71 IN | RESPIRATION RATE: 18 BRPM

## 2025-09-02 DIAGNOSIS — L73.9 FOLLICULITIS: Primary | ICD-10-CM

## 2025-09-02 PROCEDURE — 99284 EMERGENCY DEPT VISIT MOD MDM: CPT | Performed by: EMERGENCY MEDICINE

## 2025-09-02 PROCEDURE — 250N000013 HC RX MED GY IP 250 OP 250 PS 637: Performed by: EMERGENCY MEDICINE

## 2025-09-02 RX ORDER — SULFAMETHOXAZOLE AND TRIMETHOPRIM 800; 160 MG/1; MG/1
1 TABLET ORAL ONCE
Status: COMPLETED | OUTPATIENT
Start: 2025-09-02 | End: 2025-09-02

## 2025-09-02 RX ORDER — SULFAMETHOXAZOLE AND TRIMETHOPRIM 800; 160 MG/1; MG/1
1 TABLET ORAL 2 TIMES DAILY
Qty: 14 TABLET | Refills: 0 | Status: SHIPPED | OUTPATIENT
Start: 2025-09-02 | End: 2025-09-09

## 2025-09-02 RX ORDER — MUPIROCIN 2 %
OINTMENT (GRAM) TOPICAL 3 TIMES DAILY
Qty: 22 G | Refills: 0 | Status: SHIPPED | OUTPATIENT
Start: 2025-09-02

## 2025-09-02 RX ADMIN — SULFAMETHOXAZOLE AND TRIMETHOPRIM 1 TABLET: 800; 160 TABLET ORAL at 03:00

## 2025-09-02 ASSESSMENT — ACTIVITIES OF DAILY LIVING (ADL)
ADLS_ACUITY_SCORE: 41
